# Patient Record
Sex: FEMALE | Race: WHITE | NOT HISPANIC OR LATINO | Employment: OTHER | URBAN - METROPOLITAN AREA
[De-identification: names, ages, dates, MRNs, and addresses within clinical notes are randomized per-mention and may not be internally consistent; named-entity substitution may affect disease eponyms.]

---

## 2018-11-12 RX ORDER — BETAMETHASONE DIPROPIONATE 0.5 MG/G
1 CREAM TOPICAL 2 TIMES DAILY
Refills: 0 | COMMUNITY
Start: 2018-10-10 | End: 2018-11-13

## 2018-11-12 RX ORDER — METOPROLOL TARTRATE 50 MG/1
50 TABLET, FILM COATED ORAL EVERY 12 HOURS SCHEDULED
Refills: 0 | COMMUNITY
Start: 2018-08-19

## 2018-11-12 RX ORDER — ATORVASTATIN CALCIUM 40 MG/1
20 TABLET, FILM COATED ORAL
COMMUNITY
Start: 2017-08-30 | End: 2019-02-28 | Stop reason: SDUPTHER

## 2018-11-12 RX ORDER — TRAMADOL HYDROCHLORIDE 50 MG/1
50 TABLET ORAL 3 TIMES DAILY
Refills: 0 | COMMUNITY
Start: 2018-11-09

## 2018-11-12 RX ORDER — APIXABAN 5 MG/1
5 TABLET, FILM COATED ORAL 2 TIMES DAILY
Refills: 0 | COMMUNITY
Start: 2018-11-06 | End: 2019-02-28 | Stop reason: SDUPTHER

## 2018-11-12 RX ORDER — ALENDRONATE SODIUM 70 MG/1
70 TABLET ORAL WEEKLY
Refills: 0 | COMMUNITY
Start: 2018-08-18 | End: 2020-05-11

## 2018-11-12 RX ORDER — GLUCOSAMINE/CHONDR SU A SOD 750-600 MG
1 TABLET ORAL DAILY
Refills: 0 | COMMUNITY
Start: 2018-11-01 | End: 2018-11-13

## 2018-11-12 RX ORDER — COLCHICINE 0.6 MG/1
0.6 TABLET ORAL DAILY
Refills: 0 | COMMUNITY
Start: 2018-11-05

## 2018-11-12 RX ORDER — AMLODIPINE BESYLATE 5 MG/1
5 TABLET ORAL DAILY
Refills: 0 | COMMUNITY
Start: 2018-09-11 | End: 2020-12-03 | Stop reason: SINTOL

## 2018-11-12 RX ORDER — TRAZODONE HYDROCHLORIDE 50 MG/1
50 TABLET ORAL
COMMUNITY
Start: 2018-07-12 | End: 2019-02-28 | Stop reason: SDUPTHER

## 2018-11-13 ENCOUNTER — OFFICE VISIT (OUTPATIENT)
Dept: FAMILY MEDICINE CLINIC | Facility: CLINIC | Age: 73
End: 2018-11-13
Payer: COMMERCIAL

## 2018-11-13 VITALS
WEIGHT: 142 LBS | BODY MASS INDEX: 24.24 KG/M2 | HEIGHT: 64 IN | DIASTOLIC BLOOD PRESSURE: 76 MMHG | SYSTOLIC BLOOD PRESSURE: 128 MMHG | OXYGEN SATURATION: 97 % | HEART RATE: 68 BPM

## 2018-11-13 DIAGNOSIS — Z12.31 ENCOUNTER FOR SCREENING MAMMOGRAM FOR BREAST CANCER: Primary | ICD-10-CM

## 2018-11-13 DIAGNOSIS — E78.49 OTHER HYPERLIPIDEMIA: ICD-10-CM

## 2018-11-13 DIAGNOSIS — F51.04 CHRONIC INSOMNIA: ICD-10-CM

## 2018-11-13 DIAGNOSIS — E04.1 THYROID NODULE: ICD-10-CM

## 2018-11-13 DIAGNOSIS — I10 ESSENTIAL HYPERTENSION: ICD-10-CM

## 2018-11-13 DIAGNOSIS — I67.1 INTRACRANIAL ANEURYSM: ICD-10-CM

## 2018-11-13 DIAGNOSIS — Z23 NEEDS FLU SHOT: ICD-10-CM

## 2018-11-13 DIAGNOSIS — I63.10 CEREBROVASCULAR ACCIDENT (CVA) DUE TO EMBOLISM OF PRECEREBRAL ARTERY (HCC): ICD-10-CM

## 2018-11-13 DIAGNOSIS — I48.0 PAROXYSMAL ATRIAL FIBRILLATION (HCC): ICD-10-CM

## 2018-11-13 PROBLEM — M11.249: Status: ACTIVE | Noted: 2018-11-13

## 2018-11-13 PROBLEM — M25.50 MULTIPLE JOINT PAIN: Status: ACTIVE | Noted: 2018-11-13

## 2018-11-13 PROBLEM — E55.9 VITAMIN D DEFICIENCY: Status: ACTIVE | Noted: 2018-11-13

## 2018-11-13 PROBLEM — M81.8 IDIOPATHIC OSTEOPOROSIS: Status: ACTIVE | Noted: 2018-11-13

## 2018-11-13 PROCEDURE — 1036F TOBACCO NON-USER: CPT | Performed by: INTERNAL MEDICINE

## 2018-11-13 PROCEDURE — 1160F RVW MEDS BY RX/DR IN RCRD: CPT | Performed by: INTERNAL MEDICINE

## 2018-11-13 PROCEDURE — 3725F SCREEN DEPRESSION PERFORMED: CPT | Performed by: INTERNAL MEDICINE

## 2018-11-13 PROCEDURE — 99204 OFFICE O/P NEW MOD 45 MIN: CPT | Performed by: INTERNAL MEDICINE

## 2018-11-13 PROCEDURE — 3008F BODY MASS INDEX DOCD: CPT | Performed by: INTERNAL MEDICINE

## 2018-11-13 PROCEDURE — 4040F PNEUMOC VAC/ADMIN/RCVD: CPT | Performed by: INTERNAL MEDICINE

## 2018-11-13 PROCEDURE — 3078F DIAST BP <80 MM HG: CPT | Performed by: INTERNAL MEDICINE

## 2018-11-13 PROCEDURE — 3074F SYST BP LT 130 MM HG: CPT | Performed by: INTERNAL MEDICINE

## 2018-11-13 PROCEDURE — 1101F PT FALLS ASSESS-DOCD LE1/YR: CPT | Performed by: INTERNAL MEDICINE

## 2018-11-13 RX ORDER — CYCLOBENZAPRINE HCL 10 MG
10 TABLET ORAL DAILY
COMMUNITY
End: 2018-11-13

## 2018-11-13 RX ORDER — ASPIRIN 81 MG/1
81 TABLET ORAL DAILY
COMMUNITY

## 2018-11-13 RX ORDER — DICLOFENAC POTASSIUM 50 MG/1
50 TABLET, FILM COATED ORAL AS NEEDED
COMMUNITY
End: 2019-08-01

## 2018-11-13 RX ORDER — ALPRAZOLAM 0.5 MG/1
0.5 TABLET ORAL AS NEEDED
COMMUNITY
End: 2018-11-13

## 2018-11-13 RX ORDER — CELECOXIB 200 MG/1
200 CAPSULE ORAL AS NEEDED
COMMUNITY
End: 2018-11-13

## 2018-11-13 RX ORDER — ERGOCALCIFEROL (VITAMIN D2) 1250 MCG
50000 CAPSULE ORAL WEEKLY
COMMUNITY
End: 2021-07-13

## 2018-11-13 NOTE — ASSESSMENT & PLAN NOTE
Given previous history of stroke, we discussed that continuing Eliquis would be important to prevent further strokes

## 2018-11-13 NOTE — PATIENT INSTRUCTIONS
Other hyperlipidemia  Check fasting lipid panel in the next few days  Continue atorvastatin  Paroxysmal atrial fibrillation (HCC)  Given previous history of stroke, we discussed that continuing Eliquis would be important to prevent further strokes  Essential hypertension  Blood pressure is well controlled on amlodipine and metoprolol  Thyroid nodule  Previous thyroid ultrasound confirms a multi nodular goiter  Follow clinically  Vitamin D deficiency  Continue taking ergo calciferol under the direction of Rheumatology  Chronic insomnia  Given chronic insomnia and treatment with trazodone for more than a decade, will continue treatment  Cerebrovascular accident (CVA) due to embolism of precerebral artery ([address] )  Given previous stroke, will continue Eliquis and blood pressure management  Intracranial aneurysm  Review Dr Rich Otto note from earlier this year  He recommended follow-up CT scan in April of 2019

## 2018-11-13 NOTE — ASSESSMENT & PLAN NOTE
Given chronic insomnia and treatment with trazodone for more than a decade, will continue treatment

## 2018-11-13 NOTE — ASSESSMENT & PLAN NOTE
Reviewed Dr Robby Hayes note from earlier this year  He had offer her an endovascular procedure but she was concerned about the risks and wanted to bring her son to the next visit  She is concerned about actually having a stroke because of the procedure  For now, she just wants clinical follow-up  He recommended follow-up CT scan in April of 2019

## 2018-11-13 NOTE — PROGRESS NOTES
Assessment/Plan:    Problem List Items Addressed This Visit        Endocrine    Thyroid nodule     Previous thyroid ultrasound confirms a multi nodular goiter  Follow clinically  Relevant Medications    metoprolol tartrate (LOPRESSOR) 50 mg tablet       Cardiovascular and Mediastinum    Essential hypertension     Blood pressure is well controlled on amlodipine and metoprolol  Relevant Medications    amLODIPine (NORVASC) 5 mg tablet    metoprolol tartrate (LOPRESSOR) 50 mg tablet    Paroxysmal atrial fibrillation (HCC)     Given previous history of stroke, we discussed that continuing Eliquis would be important to prevent further strokes  Relevant Medications    amLODIPine (NORVASC) 5 mg tablet    metoprolol tartrate (LOPRESSOR) 50 mg tablet    Cerebrovascular accident (CVA) due to embolism of precerebral artery ([address] )     Given previous stroke, will continue Eliquis and blood pressure management  Intracranial aneurysm     Review Dr Weldon Like note from earlier this year  He recommended follow-up CT scan in April of 2019  Relevant Orders    CTA head w wo contrast       Other    Other hyperlipidemia     Check fasting lipid panel in the next few days  Continue atorvastatin  Relevant Medications    atorvastatin (LIPITOR) 40 mg tablet    Other Relevant Orders    Lipid panel    Basic metabolic panel    Chronic insomnia     Given chronic insomnia and treatment with trazodone for more than a decade, will continue treatment  Other Visit Diagnoses     Encounter for screening mammogram for breast cancer    -  Primary    Relevant Orders    Mammo screening bilateral w 3d & cad    Needs flu shot              Chief complaint: Here to establish care    HPI:    Dyana Barthel is a 74 y o  female recently changed insurance and needs a new PCP  No acute issues today  She had a stroke 2 years ago    She presented with acute onset of right arm and leg weakness as well as dysarthria  She was admitted to [facility]   She received intravenous tPA per protocol  During hospitalization she developed an episode of atrial fibrillation which raise the suspicion of a cardioembolic stroke  Echocardiogram at that time showed normal LV function but presence of wall motion abnormalities  She was started on Eliquis, a baby aspirin, and beta-blocker  She was subsequently sent to the [facility]  Although her speech improved, sometimes she has difficulty remembering the correct word to say  Right arm strength improved but she does not feel as steady on her feet  She also has some numbness in her fingertips  She reports that she had palpitations intermittently over the years but it had never been detected on EKG  She hasn't noticed any palpitations after her hospitalization in 2016  Although she has a history of arthritis, she has never had a gout attack  The rheumatologist has her taking colchine for prevention  Past Medical History:   Diagnosis Date    Aneurysm ([address] )     Followed by Dr Misty Garcia in [place]  CTA  April 2018 Stable left C6 aneurysm    Arthritis     Atrial fibrillation (HCC)     Insomnia     Has been taking trazodone for sleep for at least a decade  No side effects  She can't sleep without the trazodone   Multinodular goiter 2016    Throid US confirmed multinodular goiter   Stroke [facility])         Past Surgical History:   Procedure Laterality Date    APPENDECTOMY      BUNIONECTOMY      CATARACT EXTRACTION W/ INTRAOCULAR LENS  IMPLANT, BILATERAL          Family History   Problem Relation Age of Onset    Cancer Mother     Stroke Mother     No Known Problems Father         Social History     Social History    Marital status:      Spouse name: N/A    Number of children: N/A    Years of education: N/A     Occupational History    Not on file       Social History Main Topics    Smoking status: Never Smoker    Smokeless tobacco: Never Used    Alcohol use No    Drug use: No    Sexual activity: Not on file     Other Topics Concern    Not on file     Social History Narrative    She lives alone in [facility]  Her son is nearby  Current Outpatient Prescriptions   Medication Sig Dispense Refill    alendronate (FOSAMAX) 70 mg tablet Take 70 mg by mouth daily  0    amLODIPine (NORVASC) 5 mg tablet Take 5 mg by mouth daily  0    aspirin (ECOTRIN LOW STRENGTH) 81 mg EC tablet Take 81 mg by mouth daily      atorvastatin (LIPITOR) 40 mg tablet Take 40 mg by mouth daily at bedtime      Calcium Carbonate-Vitamin D (CALCIUM-CARB 600 + D PO) Take 1 tablet by mouth daily      colchicine (COLCRYS) 0 6 mg tablet Take 0 6 mg by mouth daily  0    ELIQUIS 5 MG Take 5 mg by mouth 2 (two) times a day  0    ergocalciferol (ERGOCALCIFEROL) 37811 units capsule Take 50,000 Units by mouth once a week      metoprolol tartrate (LOPRESSOR) 50 mg tablet Take 50 mg by mouth every 12 (twelve) hours    0    Multiple Vitamins-Minerals (MULTIVITAMIN ADULT PO) Take 1 tablet by mouth daily      traMADol (ULTRAM) 50 mg tablet Take 50 mg by mouth 3 (three) times a day    0    traZODone (DESYREL) 50 mg tablet Take 50 mg by mouth daily at bedtime      diclofenac potassium (CATAFLAM) 50 mg tablet Take 50 mg by mouth as needed       No current facility-administered medications for this visit  No Known Allergies    Review of Systems   Constitutional: Negative for chills, diaphoresis, fever and unexpected weight change  HENT: Negative for sinus pain and sinus pressure  Eyes: Negative for visual disturbance  Respiratory: Negative for cough, shortness of breath and wheezing  Cardiovascular: Negative for chest pain and leg swelling  Gastrointestinal: Positive for constipation ( occasional, takes prune juice  She has had colonoscopy in 2013 )  Negative for abdominal pain and blood in stool  Endocrine: Negative for polydipsia and polyuria  Genitourinary: Negative for dysuria and hematuria  Gets nocturia 3-4 times a night  Musculoskeletal:        Per HPI  Neurological:        Per HPI  Hematological: Negative for adenopathy  Bruises/bleeds easily ( Attributes this to Eliquis )        /76 (BP Location: Left arm, Patient Position: Sitting, Cuff Size: Standard)   Pulse 68   Ht 5' 4" (1 626 m)   Wt 64 4 kg (142 lb)   SpO2 97%   BMI 24 37 kg/m²     General:  Well-developed, well-nourished, in no acute distress  Skin:  Warm, moist, no significant lesions  HENT:  Normocephalic, atraumatic, tympanic membranes clear bilaterally, ear canals unremarkable bilaterally, nasal mucosa without lesions, oropharynx was clear without exudate  Eyes: PERRL, EOMI, conjunctivae normal   Neck:  No thyromegaly, thyroid nodules or cervical lymphadenopathy  Cardiac:  Regular rate and rhythm, no murmur, gallop, or rub  There is no JVD or HJR  Lungs:  Clear to auscultation and percussion  Breasts: Deferred to Gyn  Abdomen:  Soft, nontender, normoactive bowel sounds, no palpable masses, no hepatosplenomegaly  :  Deferred to gyn  Musculoskeletal:  No clubbing, cyanosis, or edema  Neurologic:  Cranial nerves 2-12 intact, motor was 5/5 in all major groups, no pronator drift noted, rapid alternating movements are faster on the right than on the left (she is left handed) DTRs 2+ throughout, with the exception of her knee jerks and ankle jerks which were 3+  Psychiatric:  Mood bright, appropriate affect and insight

## 2018-11-14 NOTE — ASSESSMENT & PLAN NOTE
Although she has never had an episode of acute gout, I believe that the rheumatologist has her on colchicine for preventive reasons given the fact that she has chondrocalcinosis on her hand

## 2018-11-19 ENCOUNTER — TRANSCRIBE ORDERS (OUTPATIENT)
Dept: ADMINISTRATIVE | Facility: HOSPITAL | Age: 73
End: 2018-11-19

## 2018-11-19 ENCOUNTER — APPOINTMENT (OUTPATIENT)
Dept: LAB | Facility: CLINIC | Age: 73
End: 2018-11-19
Payer: COMMERCIAL

## 2018-11-19 DIAGNOSIS — E78.49 OTHER HYPERLIPIDEMIA: ICD-10-CM

## 2018-11-19 LAB
ANION GAP SERPL CALCULATED.3IONS-SCNC: 3 MMOL/L (ref 4–13)
BUN SERPL-MCNC: 13 MG/DL (ref 5–25)
CALCIUM SERPL-MCNC: 8.2 MG/DL (ref 8.3–10.1)
CHLORIDE SERPL-SCNC: 103 MMOL/L (ref 100–108)
CHOLEST SERPL-MCNC: 110 MG/DL (ref 50–200)
CO2 SERPL-SCNC: 30 MMOL/L (ref 21–32)
CREAT SERPL-MCNC: 0.81 MG/DL (ref 0.6–1.3)
GFR SERPL CREATININE-BSD FRML MDRD: 71 ML/MIN/1.73SQ M
GLUCOSE P FAST SERPL-MCNC: 96 MG/DL (ref 65–99)
HDLC SERPL-MCNC: 62 MG/DL (ref 40–60)
LDLC SERPL CALC-MCNC: 32 MG/DL (ref 0–100)
NONHDLC SERPL-MCNC: 48 MG/DL
POTASSIUM SERPL-SCNC: 3.8 MMOL/L (ref 3.5–5.3)
SODIUM SERPL-SCNC: 136 MMOL/L (ref 136–145)
TRIGL SERPL-MCNC: 82 MG/DL

## 2018-11-19 PROCEDURE — 80061 LIPID PANEL: CPT

## 2018-11-19 PROCEDURE — 36415 COLL VENOUS BLD VENIPUNCTURE: CPT

## 2018-11-19 PROCEDURE — 80048 BASIC METABOLIC PNL TOTAL CA: CPT

## 2018-11-20 ENCOUNTER — TELEPHONE (OUTPATIENT)
Dept: FAMILY MEDICINE CLINIC | Facility: CLINIC | Age: 73
End: 2018-11-20

## 2018-11-20 NOTE — TELEPHONE ENCOUNTER
Please let her know that her cholesterol was great  Her calcium was just a little bit low at 8 2 (normal is 8 3)  This just merits observation for now and we will repeat the metabolic panel in the future

## 2019-02-28 ENCOUNTER — OFFICE VISIT (OUTPATIENT)
Dept: FAMILY MEDICINE CLINIC | Facility: CLINIC | Age: 74
End: 2019-02-28
Payer: COMMERCIAL

## 2019-02-28 VITALS
SYSTOLIC BLOOD PRESSURE: 120 MMHG | BODY MASS INDEX: 25.06 KG/M2 | OXYGEN SATURATION: 98 % | DIASTOLIC BLOOD PRESSURE: 74 MMHG | HEIGHT: 64 IN | WEIGHT: 146.8 LBS | HEART RATE: 58 BPM

## 2019-02-28 DIAGNOSIS — I67.1 INTRACRANIAL ANEURYSM: ICD-10-CM

## 2019-02-28 DIAGNOSIS — E55.9 VITAMIN D DEFICIENCY: ICD-10-CM

## 2019-02-28 DIAGNOSIS — Z00.00 ENCOUNTER FOR MEDICARE ANNUAL WELLNESS EXAM: ICD-10-CM

## 2019-02-28 DIAGNOSIS — F51.04 CHRONIC INSOMNIA: ICD-10-CM

## 2019-02-28 DIAGNOSIS — I48.0 PAROXYSMAL ATRIAL FIBRILLATION (HCC): Primary | ICD-10-CM

## 2019-02-28 DIAGNOSIS — I10 ESSENTIAL HYPERTENSION: ICD-10-CM

## 2019-02-28 DIAGNOSIS — M81.8 IDIOPATHIC OSTEOPOROSIS: ICD-10-CM

## 2019-02-28 DIAGNOSIS — E78.49 OTHER HYPERLIPIDEMIA: ICD-10-CM

## 2019-02-28 PROCEDURE — G0439 PPPS, SUBSEQ VISIT: HCPCS | Performed by: INTERNAL MEDICINE

## 2019-02-28 PROCEDURE — 99214 OFFICE O/P EST MOD 30 MIN: CPT | Performed by: INTERNAL MEDICINE

## 2019-02-28 RX ORDER — APIXABAN 5 MG/1
5 TABLET, FILM COATED ORAL 2 TIMES DAILY
Qty: 60 TABLET | Refills: 5 | Status: SHIPPED | OUTPATIENT
Start: 2019-02-28 | End: 2019-09-11 | Stop reason: SDUPTHER

## 2019-02-28 RX ORDER — ATORVASTATIN CALCIUM 40 MG/1
20 TABLET, FILM COATED ORAL
Qty: 15 TABLET | Refills: 5 | Status: SHIPPED | OUTPATIENT
Start: 2019-02-28 | End: 2021-02-12

## 2019-02-28 RX ORDER — TRAZODONE HYDROCHLORIDE 50 MG/1
50 TABLET ORAL
Qty: 30 TABLET | Refills: 5 | Status: SHIPPED | OUTPATIENT
Start: 2019-02-28 | End: 2020-02-13

## 2019-02-28 NOTE — PATIENT INSTRUCTIONS
Intracranial aneurysm  Follow up CTA of brain scheduled for April  Essential hypertension  Excellent blood pressure on current regimen  Paroxysmal atrial fibrillation (HCC)  Rate is controlled on metoprolol  Continue Eliquis for stroke prevention  Cerebrovascular accident (CVA) due to embolism of precerebral artery (HCC)  No evidence of any new stroke  Continue Eliquis  Idiopathic osteoporosis  Most recent bone density was obtained in November  Is on alendronate 70 mg weekly  Continue to follow with Rheumatology (Dr Brice President) in [place]  Other hyperlipidemia  All excellent cholesterol on atorvastatin 1/2 of a 40 mg tablet daily  Recheck lipid panel before next visit

## 2019-02-28 NOTE — PROGRESS NOTES
Assessment/Plan:    Problem List Items Addressed This Visit        Cardiovascular and Mediastinum    Essential hypertension     Excellent blood pressure on current regimen  Paroxysmal atrial fibrillation (HCC) - Primary     Rate is controlled on metoprolol  Continue Eliquis for stroke prevention  Relevant Medications    ELIQUIS 5 MG    Intracranial aneurysm     Follow up CTA of brain scheduled for April  Musculoskeletal and Integument    Idiopathic osteoporosis     Most recent bone density was obtained in November  Is on alendronate 70 mg weekly  Continue to follow with Rheumatology (Dr Nisha Curiel) in [place]  Other    Other hyperlipidemia     All excellent cholesterol on atorvastatin 1/2 of a 40 mg tablet daily  Recheck lipid panel before next visit  Relevant Medications    atorvastatin (LIPITOR) 40 mg tablet    Other Relevant Orders    Basic metabolic panel    Lipid panel    Vitamin D deficiency    Relevant Medications    Calcium Carbonate-Vitamin D (CALCIUM-CARB 600 + D) 600-125 MG-UNIT TABS    Other Relevant Orders    Vitamin D 25 hydroxy    RESOLVED: Chronic insomnia    Relevant Medications    traZODone (DESYREL) 50 mg tablet          Subjective:     Patient ID: Sedrick Ness is a 74 y o  female here for follow up of multiple issues  She requested refills of multiple medications  She remains on Eliquis for chronic atrial fibrillation  She hasn't noted any episodes of palpitations  She hasn't had any new stroke symptoms specifically no weakness or dysarthria  She has occasional word-finding problems  She takes 1/2 of a 40 mg atorvastatin for high cholesterol  Her total cholesterol in November '18 was 110 with an LDL of 32  She saw her rheumatologist for follow-up of osteoporosis in November  A DEXA at that time revealed low bone mass at the hip, spine and other areas with a T-score of -1 3        Objective:    /74 (BP Location: Left arm, Patient Position: Sitting, Cuff Size: Standard)   Pulse 58   Ht 5' 4" (1 626 m)   Wt 66 6 kg (146 lb 12 8 oz)   SpO2 98%   BMI 25 20 kg/m²       Physical Exam    General:  Well-developed, well-nourished, in no acute distress  Cardiac:  Regular rate and rhythm, no murmur, gallop, or rub  There is no JVD or HJR  Lungs:  Clear to auscultation and percussion  Abdomen:  Soft, nontender, normoactive bowel sounds, no palpable masses, no hepatosplenomegaly  Extremities:  No clubbing, cyanosis, or edema  BMI Counseling: Body mass index is 25 2 kg/m²  Discussed the patient's BMI with her  The BMI is above average  No BMI follow-up plan is appropriate  Patient is 70 years old and weight reduction/weight gain would further complicate their underlying illness  And at BMI 25 2 and 75 years with multiple cormorbidities she is not likely going to benefit from weight loss

## 2019-02-28 NOTE — PROGRESS NOTES
Marlys Zhang is here for her Subsequent Wellness visit  Last Medicare Wellness visit information reviewed, patient interviewed and updates made to the record today  Health Risk Assessment:  Patient rates overall health as good  Patient feels that their physical health rating is Same  Eyesight was rated as Slightly worse  Hearing was rated as Slightly worse  Patient feels that their emotional and mental health rating is Same  Pain experienced by patient in the last 7 days has been None  Patient states that she has experienced no weight loss or gain in last 6 months  Emotional/Mental Health:  Patient has been feeling nervous/anxious  PHQ-9 Depression Screening:    Frequency of the following problems over the past two weeks:      1  Little interest or pleasure in doing things: 0 - not at all      2  Feeling down, depressed, or hopeless: 0 - not at all  PHQ-2 Score: 0          Broken Bones/Falls: Fall Risk Assessment:    In the past year, patient has experienced: No history of falling in past year          Bladder/Bowel:  Patient has leaked urine accidently in the last six months  Patient reports no loss of bowel control  (Additional Comments: She may have urine incontinence if she has to get up at night to urinate  No problems during the day )    Immunizations:  Patient has had a flu vaccination within the last year  Patient has received a pneumonia shot  Patient has not received a shingles shot  Patient has not received tetanus/diphtheria shot  (Additional Comments: She has a prescription to get the shingles shot  TdaP is not covered by Medicare routinely )    Home Safety:  Patient has trouble with stairs inside or outside of their home  Patient currently reports that there are no safety hazards present in home, working smoke alarms, no working carbon monoxide detectors  (Additional Comments: After her stroke, she started taking more care on the stairs in her house    Recommended a CO monitor )    Preventative Screenings:   Breast cancer screening performed, colon cancer screen completed, cholesterol screen completed, glaucoma eye exam completed, (Additional Comments: She is scheduled to see her eye doctor  She is a glaucoma suspect and does get her eye pressure checked )    Nutrition:  Current diet: Low Saturated Fat, Regular and Limited junk food with servings of the following:    Medications:  Patient is currently taking over-the-counter supplements  List of OTC medications includes: Vit D + calcium  Patient is able to manage medications  Lifestyle Choices:  Patient reports no tobacco use  Patient has not smoked or used tobacco in the past   Patient reports no alcohol use  Patient drives a vehicle  Patient wears seat belt  Current level of exercise of physical activity described by patient as: Doesn't exercise normally  Activities of Daily Living:  Can get out of bed by his or her self, able to dress self, able to make own meals, able to do own shopping, able to bathe self, can do own laundry/housekeeping, can manage own money, pay bills and track expenses    Previous Hospitalizations:  No hospitalization or ED visit in past 12 months        Advanced Directives:  Patient has decided on a power of   Patient has spoken to designated power of   Patient has completed advanced directive          Preventative Screening/Counseling:      Cardiovascular:      General: Screening Current          Diabetes:      General: Screening Current          Colorectal Cancer:      General: Screening Current          Breast Cancer:      General: Screening Current          Cervical Cancer:      General: Screening Current          Osteoporosis:      General: Screening Current      Comments: Had follow up DEXA at rheumatologist's office in November 2018        AAA:      General: Screening Not Indicated          Glaucoma:      General: Screening Current          HIV:      General: Screening Not Indicated          Hepatitis C:      General: Screening Not Indicated        Advanced Directives:   Patient has living will for healthcare, has durable POA for healthcare, patient does not have an advanced directive  5 wishes given  End of life assessment reviewed with patient  Provider agrees with end of life decisions   Additional Comments: Her son Brandon Finnegan is her POA    Immunizations: Additional Comments: Plans on getting shingles vaccine in the near future

## 2019-03-01 ENCOUNTER — TELEPHONE (OUTPATIENT)
Dept: FAMILY MEDICINE CLINIC | Facility: CLINIC | Age: 74
End: 2019-03-01

## 2019-03-01 DIAGNOSIS — M81.8 IDIOPATHIC OSTEOPOROSIS: Primary | ICD-10-CM

## 2019-03-01 DIAGNOSIS — E55.9 VITAMIN D DEFICIENCY: ICD-10-CM

## 2019-03-01 NOTE — TELEPHONE ENCOUNTER
Epic made me change to the 600-125 yesterday (actually not a big deal)  I will see if I can change it

## 2019-03-01 NOTE — TELEPHONE ENCOUNTER
Pharmacy sent paperwork regarding script for Calcium Carbonate  Vit D    They said she was always on 500-200  The script we sent for the 600-125 is not available from their supplier  They are wondering if we could change the script to the 500-200 tablets?     Thank you

## 2019-08-01 ENCOUNTER — OFFICE VISIT (OUTPATIENT)
Dept: FAMILY MEDICINE CLINIC | Facility: CLINIC | Age: 74
End: 2019-08-01
Payer: COMMERCIAL

## 2019-08-01 VITALS
HEIGHT: 64 IN | OXYGEN SATURATION: 97 % | HEART RATE: 66 BPM | DIASTOLIC BLOOD PRESSURE: 72 MMHG | SYSTOLIC BLOOD PRESSURE: 120 MMHG | WEIGHT: 142.4 LBS | BODY MASS INDEX: 24.31 KG/M2

## 2019-08-01 DIAGNOSIS — E78.49 OTHER HYPERLIPIDEMIA: ICD-10-CM

## 2019-08-01 DIAGNOSIS — L30.9 DERMATITIS: Primary | ICD-10-CM

## 2019-08-01 DIAGNOSIS — I63.10 CEREBROVASCULAR ACCIDENT (CVA) DUE TO EMBOLISM OF PRECEREBRAL ARTERY (HCC): ICD-10-CM

## 2019-08-01 DIAGNOSIS — I10 ESSENTIAL HYPERTENSION: ICD-10-CM

## 2019-08-01 DIAGNOSIS — I48.0 PAROXYSMAL ATRIAL FIBRILLATION (HCC): ICD-10-CM

## 2019-08-01 PROCEDURE — 3074F SYST BP LT 130 MM HG: CPT | Performed by: INTERNAL MEDICINE

## 2019-08-01 PROCEDURE — 1160F RVW MEDS BY RX/DR IN RCRD: CPT | Performed by: INTERNAL MEDICINE

## 2019-08-01 PROCEDURE — 1036F TOBACCO NON-USER: CPT | Performed by: INTERNAL MEDICINE

## 2019-08-01 PROCEDURE — 99214 OFFICE O/P EST MOD 30 MIN: CPT | Performed by: INTERNAL MEDICINE

## 2019-08-01 RX ORDER — BETAMETHASONE DIPROPIONATE 0.5 MG/G
CREAM TOPICAL 2 TIMES DAILY
Qty: 50 G | Refills: 1 | Status: SHIPPED | OUTPATIENT
Start: 2019-08-01 | End: 2020-09-14

## 2019-08-01 NOTE — PATIENT INSTRUCTIONS
Problem List Items Addressed This Visit        Cardiovascular and Mediastinum    Essential hypertension     Excellent blood pressure control on amlodipine 5 mg daily  Paroxysmal atrial fibrillation (HCC)     Rate is controlled on metoprolol 50 mg twice a day  Continue Eliquis for stroke prevention  Other    Other hyperlipidemia    Relevant Orders    Lipid panel    Basic metabolic panel      Other Visit Diagnoses     Dermatitis    -  Primary    Will renewed betamethasone to apply twice a day  After rash resolves, would recommend applying lotion daily after showering      Relevant Medications    betamethasone dipropionate (DIPROSONE) 0 05 % cream

## 2019-08-01 NOTE — PROGRESS NOTES
Assessment/Plan:    Problem List Items Addressed This Visit        Cardiovascular and Mediastinum    Essential hypertension     Excellent blood pressure control on amlodipine 5 mg daily  Paroxysmal atrial fibrillation (HCC)     Rate is controlled on metoprolol 50 mg twice a day  Continue Eliquis for stroke prevention  Cerebrovascular accident (CVA) due to embolism of precerebral artery (HCC)     No new stroke symptoms  Continue Eliquis for stroke prevention  Other    Other hyperlipidemia    Relevant Orders    Lipid panel    Basic metabolic panel      Other Visit Diagnoses     Dermatitis    -  Primary    Will renewed betamethasone to apply twice a day  After rash resolves, would recommend applying lotion daily after showering  Relevant Medications    betamethasone dipropionate (DIPROSONE) 0 05 % cream          Subjective:     Patient ID: Wilbert Liang is a 74 y o  female reports itchy spots on her flanks and left lower abdomen  She has had this rash in the past and her previous physician gave her betamethasone dipropionate 0 05% which she finds very helpful for the rash  However, she has been using the cream sparingly because her tube is almost out  She has not had any new stroke symptoms  She sometimes notes palpitations when she is out in the heat but most the time she has no cardiac symptoms  She is taking amlodipine 5 mg daily for her blood pressure  She wanted to know if she could ever get off her blood pressure medications  Objective:    /72 (BP Location: Left arm, Patient Position: Sitting, Cuff Size: Standard)   Pulse 66   Ht 5' 4" (1 626 m)   Wt 64 6 kg (142 lb 6 4 oz)   SpO2 97%   BMI 24 44 kg/m²     Wt Readings from Last 3 Encounters:   08/01/19 64 6 kg (142 lb 6 4 oz)   02/28/19 66 6 kg (146 lb 12 8 oz)   11/13/18 64 4 kg (142 lb)         Physical Exam    General:  Well-developed, well-nourished, in no acute distress    Cardiac:  Regular rate and rhythm, no murmur, gallop, or rub  There is no JVD or HJR  Lungs:  Clear to auscultation and percussion  Abdomen:  Soft, nontender, normoactive bowel sounds, no palpable masses, no hepatosplenomegaly  Skin:  Superficial excoriations on the flanks and the left lower abdomen bilaterally  The skin itself does have some mild hand discoloration over the same regions  Extremities:  No clubbing, cyanosis, or edema

## 2019-09-11 DIAGNOSIS — I48.0 PAROXYSMAL ATRIAL FIBRILLATION (HCC): ICD-10-CM

## 2019-09-11 RX ORDER — GLUCOSAMINE/CHONDR SU A SOD 750-600 MG
TABLET ORAL
Refills: 0 | COMMUNITY
Start: 2019-08-28 | End: 2019-09-11

## 2019-09-11 RX ORDER — APIXABAN 5 MG/1
TABLET, FILM COATED ORAL
Qty: 60 TABLET | Refills: 5 | Status: SHIPPED | OUTPATIENT
Start: 2019-09-11 | End: 2020-06-02

## 2019-09-13 DIAGNOSIS — M81.8 IDIOPATHIC OSTEOPOROSIS: ICD-10-CM

## 2019-09-13 RX ORDER — GLUCOSAMINE/CHONDR SU A SOD 750-600 MG
TABLET ORAL
Qty: 30 TABLET | Refills: 5 | Status: SHIPPED | OUTPATIENT
Start: 2019-09-13 | End: 2020-04-01

## 2019-10-28 ENCOUNTER — APPOINTMENT (OUTPATIENT)
Dept: LAB | Facility: CLINIC | Age: 74
End: 2019-10-28
Payer: COMMERCIAL

## 2019-10-28 ENCOUNTER — TRANSCRIBE ORDERS (OUTPATIENT)
Dept: URGENT CARE | Facility: CLINIC | Age: 74
End: 2019-10-28

## 2019-10-28 DIAGNOSIS — E78.49 OTHER HYPERLIPIDEMIA: ICD-10-CM

## 2019-10-28 DIAGNOSIS — E78.5 HYPERLIPIDEMIA, UNSPECIFIED HYPERLIPIDEMIA TYPE: ICD-10-CM

## 2019-10-28 DIAGNOSIS — E78.5 HYPERLIPIDEMIA, UNSPECIFIED HYPERLIPIDEMIA TYPE: Primary | ICD-10-CM

## 2019-10-28 LAB
ANION GAP SERPL CALCULATED.3IONS-SCNC: 7 MMOL/L (ref 4–13)
ANION GAP SERPL CALCULATED.3IONS-SCNC: 9 MMOL/L (ref 4–13)
AST SERPL W P-5'-P-CCNC: 15 U/L (ref 5–45)
BUN SERPL-MCNC: 14 MG/DL (ref 5–25)
CALCIUM SERPL-MCNC: 8.9 MG/DL (ref 8.3–10.1)
CHLORIDE SERPL-SCNC: 110 MMOL/L (ref 100–108)
CHLORIDE SERPL-SCNC: 111 MMOL/L (ref 100–108)
CHOLEST SERPL-MCNC: 114 MG/DL (ref 50–200)
CK SERPL-CCNC: 45 U/L (ref 26–192)
CO2 SERPL-SCNC: 24 MMOL/L (ref 21–32)
CO2 SERPL-SCNC: 25 MMOL/L (ref 21–32)
CREAT SERPL-MCNC: 0.74 MG/DL (ref 0.6–1.3)
GFR SERPL CREATININE-BSD FRML MDRD: 79 ML/MIN/1.73SQ M
GLUCOSE P FAST SERPL-MCNC: 93 MG/DL (ref 65–99)
HDLC SERPL-MCNC: 66 MG/DL
LDLC SERPL CALC-MCNC: 35 MG/DL (ref 0–100)
MAGNESIUM SERPL-MCNC: 2.2 MG/DL (ref 1.6–2.6)
NONHDLC SERPL-MCNC: 48 MG/DL
POTASSIUM SERPL-SCNC: 3.9 MMOL/L (ref 3.5–5.3)
POTASSIUM SERPL-SCNC: 4 MMOL/L (ref 3.5–5.3)
SODIUM SERPL-SCNC: 142 MMOL/L (ref 136–145)
SODIUM SERPL-SCNC: 144 MMOL/L (ref 136–145)
TRIGL SERPL-MCNC: 65 MG/DL
TSH SERPL DL<=0.05 MIU/L-ACNC: 2.12 UIU/ML (ref 0.36–3.74)

## 2019-10-28 PROCEDURE — 80061 LIPID PANEL: CPT

## 2019-10-28 PROCEDURE — 80051 ELECTROLYTE PANEL: CPT

## 2019-10-28 PROCEDURE — 84450 TRANSFERASE (AST) (SGOT): CPT

## 2019-10-28 PROCEDURE — 84443 ASSAY THYROID STIM HORMONE: CPT

## 2019-10-28 PROCEDURE — 83735 ASSAY OF MAGNESIUM: CPT

## 2019-10-28 PROCEDURE — 80048 BASIC METABOLIC PNL TOTAL CA: CPT

## 2019-10-28 PROCEDURE — 36415 COLL VENOUS BLD VENIPUNCTURE: CPT

## 2019-10-28 PROCEDURE — 82550 ASSAY OF CK (CPK): CPT

## 2019-11-12 ENCOUNTER — TELEPHONE (OUTPATIENT)
Dept: FAMILY MEDICINE CLINIC | Facility: CLINIC | Age: 74
End: 2019-11-12

## 2019-11-12 NOTE — TELEPHONE ENCOUNTER
Patient was given a script in September for Calcium Carbonate to be taken twice a day  She said she has only ever taken it once a day in the past  She feels sick when she takes it twice and is wondering if that is why she didn't feel good with 2  She said she had a stroke in the past and is unsure if she should change how she is taking this medication

## 2019-11-12 NOTE — TELEPHONE ENCOUNTER
She can take the calcium carbonate once a day  I do not think this has anything to do with any potential risk for stroke

## 2020-02-13 DIAGNOSIS — F51.04 CHRONIC INSOMNIA: ICD-10-CM

## 2020-02-13 RX ORDER — TRAZODONE HYDROCHLORIDE 50 MG/1
TABLET ORAL
Qty: 30 TABLET | Refills: 5 | Status: SHIPPED | OUTPATIENT
Start: 2020-02-13 | End: 2020-08-31

## 2020-04-01 DIAGNOSIS — M81.8 IDIOPATHIC OSTEOPOROSIS: ICD-10-CM

## 2020-04-01 RX ORDER — GLUCOSAMINE/CHONDR SU A SOD 750-600 MG
TABLET ORAL
Qty: 30 TABLET | Refills: 5 | Status: SHIPPED | OUTPATIENT
Start: 2020-04-01 | End: 2020-04-13

## 2020-04-11 DIAGNOSIS — M81.8 IDIOPATHIC OSTEOPOROSIS: ICD-10-CM

## 2020-04-13 RX ORDER — GLUCOSAMINE/CHONDR SU A SOD 750-600 MG
TABLET ORAL
Qty: 30 TABLET | Refills: 5 | Status: SHIPPED | OUTPATIENT
Start: 2020-04-13 | End: 2020-09-21 | Stop reason: SDUPTHER

## 2020-05-11 ENCOUNTER — OFFICE VISIT (OUTPATIENT)
Dept: FAMILY MEDICINE CLINIC | Facility: CLINIC | Age: 75
End: 2020-05-11
Payer: COMMERCIAL

## 2020-05-11 VITALS
SYSTOLIC BLOOD PRESSURE: 118 MMHG | HEART RATE: 55 BPM | OXYGEN SATURATION: 97 % | BODY MASS INDEX: 26.09 KG/M2 | TEMPERATURE: 98.1 F | WEIGHT: 152.8 LBS | DIASTOLIC BLOOD PRESSURE: 68 MMHG | HEIGHT: 64 IN

## 2020-05-11 DIAGNOSIS — I10 ESSENTIAL HYPERTENSION: Primary | ICD-10-CM

## 2020-05-11 DIAGNOSIS — I63.10 CEREBROVASCULAR ACCIDENT (CVA) DUE TO EMBOLISM OF PRECEREBRAL ARTERY (HCC): ICD-10-CM

## 2020-05-11 DIAGNOSIS — M81.8 IDIOPATHIC OSTEOPOROSIS: ICD-10-CM

## 2020-05-11 DIAGNOSIS — E78.49 OTHER HYPERLIPIDEMIA: ICD-10-CM

## 2020-05-11 DIAGNOSIS — I48.0 PAROXYSMAL ATRIAL FIBRILLATION (HCC): ICD-10-CM

## 2020-05-11 PROCEDURE — 1170F FXNL STATUS ASSESSED: CPT | Performed by: INTERNAL MEDICINE

## 2020-05-11 PROCEDURE — 1125F AMNT PAIN NOTED PAIN PRSNT: CPT | Performed by: INTERNAL MEDICINE

## 2020-05-11 PROCEDURE — 4040F PNEUMOC VAC/ADMIN/RCVD: CPT | Performed by: INTERNAL MEDICINE

## 2020-05-11 PROCEDURE — G0439 PPPS, SUBSEQ VISIT: HCPCS | Performed by: INTERNAL MEDICINE

## 2020-05-11 PROCEDURE — 3074F SYST BP LT 130 MM HG: CPT | Performed by: INTERNAL MEDICINE

## 2020-05-11 PROCEDURE — 1160F RVW MEDS BY RX/DR IN RCRD: CPT | Performed by: INTERNAL MEDICINE

## 2020-05-11 PROCEDURE — 1036F TOBACCO NON-USER: CPT | Performed by: INTERNAL MEDICINE

## 2020-05-11 PROCEDURE — 3078F DIAST BP <80 MM HG: CPT | Performed by: INTERNAL MEDICINE

## 2020-05-11 PROCEDURE — 3008F BODY MASS INDEX DOCD: CPT | Performed by: INTERNAL MEDICINE

## 2020-05-11 PROCEDURE — 99214 OFFICE O/P EST MOD 30 MIN: CPT | Performed by: INTERNAL MEDICINE

## 2020-06-02 DIAGNOSIS — I48.0 PAROXYSMAL ATRIAL FIBRILLATION (HCC): ICD-10-CM

## 2020-06-02 RX ORDER — APIXABAN 5 MG/1
TABLET, FILM COATED ORAL
Qty: 60 TABLET | Refills: 5 | Status: SHIPPED | OUTPATIENT
Start: 2020-06-02 | End: 2020-11-25

## 2020-07-09 ENCOUNTER — OFFICE VISIT (OUTPATIENT)
Dept: FAMILY MEDICINE CLINIC | Facility: CLINIC | Age: 75
End: 2020-07-09
Payer: COMMERCIAL

## 2020-07-09 VITALS
HEIGHT: 64 IN | BODY MASS INDEX: 24.72 KG/M2 | WEIGHT: 144.8 LBS | DIASTOLIC BLOOD PRESSURE: 68 MMHG | TEMPERATURE: 98 F | OXYGEN SATURATION: 98 % | SYSTOLIC BLOOD PRESSURE: 120 MMHG | HEART RATE: 55 BPM

## 2020-07-09 DIAGNOSIS — G25.81 RESTLESS LEGS SYNDROME: Primary | ICD-10-CM

## 2020-07-09 DIAGNOSIS — G47.9 DIFFICULTY SLEEPING: ICD-10-CM

## 2020-07-09 PROCEDURE — 3074F SYST BP LT 130 MM HG: CPT | Performed by: NURSE PRACTITIONER

## 2020-07-09 PROCEDURE — 3008F BODY MASS INDEX DOCD: CPT | Performed by: NURSE PRACTITIONER

## 2020-07-09 PROCEDURE — 3078F DIAST BP <80 MM HG: CPT | Performed by: NURSE PRACTITIONER

## 2020-07-09 PROCEDURE — 1160F RVW MEDS BY RX/DR IN RCRD: CPT | Performed by: NURSE PRACTITIONER

## 2020-07-09 PROCEDURE — 4040F PNEUMOC VAC/ADMIN/RCVD: CPT | Performed by: NURSE PRACTITIONER

## 2020-07-09 PROCEDURE — 99213 OFFICE O/P EST LOW 20 MIN: CPT | Performed by: NURSE PRACTITIONER

## 2020-07-09 PROCEDURE — 1036F TOBACCO NON-USER: CPT | Performed by: NURSE PRACTITIONER

## 2020-07-09 RX ORDER — ROPINIROLE 0.5 MG/1
0.5 TABLET, FILM COATED ORAL
Qty: 30 TABLET | Refills: 1 | Status: SHIPPED | OUTPATIENT
Start: 2020-07-09 | End: 2020-08-28

## 2020-07-09 NOTE — PROGRESS NOTES
Assessment/Plan:       Diagnoses and all orders for this visit:    Restless legs syndrome  -     rOPINIRole (REQUIP) 0 5 mg tablet; Take 1 tablet (0 5 mg total) by mouth daily at bedtime    Difficulty sleeping  -     rOPINIRole (REQUIP) 0 5 mg tablet; Take 1 tablet (0 5 mg total) by mouth daily at bedtime      Will start her on Requip 0 5 mg at bedtime, to make us aware if it is not helping  If her sleep does not improve, she will also make us aware  Subjective:      Patient ID: Jose Boyd is a 66 y o  female  Here today for a same day visit, reports an increase in her restless legs at night since  Notes she had a cortisone injection in her back three weeks ago which may have increased her symptoms but she is now unable to sleep due to her leg movements  Reports she has had issues with restless legs intermittently for the past 30 years but has been able to control them with either pickle juice or mustard  She also does not feel the trazodone is working for her sleeping  The following portions of the patient's history were reviewed and updated as appropriate: allergies, current medications, past family history, past medical history, past social history, past surgical history and problem list     Review of Systems   Neurological:        Please see HPI  All other systems reviewed and are negative for this visit  Objective:      /68 (BP Location: Left arm, Patient Position: Sitting, Cuff Size: Standard)   Pulse 55   Temp 98 °F (36 7 °C)   Ht 5' 4" (1 626 m)   Wt 65 7 kg (144 lb 12 8 oz)   SpO2 98%   BMI 24 85 kg/m²          Physical Exam   Constitutional: She is oriented to person, place, and time  Musculoskeletal:        Right upper leg: She exhibits no swelling and no deformity  Left upper leg: She exhibits no swelling and no deformity  Right lower leg: She exhibits no swelling and no deformity          Left lower leg: She exhibits no swelling and no deformity  Neurological: She is alert and oriented to person, place, and time  She has normal strength   Coordination and gait normal

## 2020-08-28 DIAGNOSIS — G47.9 DIFFICULTY SLEEPING: ICD-10-CM

## 2020-08-28 DIAGNOSIS — G25.81 RESTLESS LEGS SYNDROME: ICD-10-CM

## 2020-08-28 RX ORDER — ROPINIROLE 0.5 MG/1
TABLET, FILM COATED ORAL
Qty: 30 TABLET | Refills: 1 | Status: SHIPPED | OUTPATIENT
Start: 2020-08-28 | End: 2020-10-30

## 2020-08-30 DIAGNOSIS — F51.04 CHRONIC INSOMNIA: ICD-10-CM

## 2020-08-31 RX ORDER — TRAZODONE HYDROCHLORIDE 50 MG/1
TABLET ORAL
Qty: 30 TABLET | Refills: 5 | Status: SHIPPED | OUTPATIENT
Start: 2020-08-31 | End: 2021-03-01

## 2020-09-14 ENCOUNTER — OFFICE VISIT (OUTPATIENT)
Dept: FAMILY MEDICINE CLINIC | Facility: CLINIC | Age: 75
End: 2020-09-14
Payer: COMMERCIAL

## 2020-09-14 VITALS
WEIGHT: 146.8 LBS | DIASTOLIC BLOOD PRESSURE: 80 MMHG | HEART RATE: 55 BPM | SYSTOLIC BLOOD PRESSURE: 128 MMHG | BODY MASS INDEX: 25.06 KG/M2 | OXYGEN SATURATION: 95 % | HEIGHT: 64 IN | TEMPERATURE: 98 F

## 2020-09-14 DIAGNOSIS — Z23 NEEDS FLU SHOT: Primary | ICD-10-CM

## 2020-09-14 DIAGNOSIS — R60.0 BILATERAL LOWER EXTREMITY EDEMA: ICD-10-CM

## 2020-09-14 PROCEDURE — G0008 ADMIN INFLUENZA VIRUS VAC: HCPCS | Performed by: INTERNAL MEDICINE

## 2020-09-14 PROCEDURE — 99213 OFFICE O/P EST LOW 20 MIN: CPT | Performed by: INTERNAL MEDICINE

## 2020-09-14 PROCEDURE — 90662 IIV NO PRSV INCREASED AG IM: CPT | Performed by: INTERNAL MEDICINE

## 2020-09-14 RX ORDER — FUROSEMIDE 20 MG/1
20 TABLET ORAL DAILY
Qty: 30 TABLET | Refills: 5 | Status: SHIPPED | OUTPATIENT
Start: 2020-09-14 | End: 2020-09-22

## 2020-09-14 NOTE — PROGRESS NOTES
Assessment/Plan:    Problem List Items Addressed This Visit        Other    Bilateral lower extremity edema     Unclear etiology though she does have some hepatic jugular reflux  There is no overt congestive heart failure  Her symptoms are waxing and waning  We are going to give her a trial of Lasix 20 mg daily for the next few days and reassess in 1 week  Consider getting another echocardiogram          Relevant Medications    furosemide (LASIX) 20 mg tablet      Other Visit Diagnoses     Needs flu shot    -  Primary    Relevant Orders    influenza vaccine, high-dose, PF 0 7 mL (FLUZONE HIGH-DOSE)          Chief Complaint     Leg Swelling; Dizziness          Patient ID: Alicia Cosme is a 66 y o  female who returns for evaluation of leg swelling  She noticed bilateral lower extremity edema about 3 weeks  It has been waxing and waning  She would nerve come in to be seen except for her edema was worse yesterday and her friends urged her to make an appointment  No recent travel, no trauma  No change in her medication regimen other than that they had added Requip for restless legs  She is on amlodipine 5 mg daily but this is long-term  Note change in her diet  He avoid salt  She had an echocardiogram in March of this year which revealed normal LV function  Objective:    /80 (BP Location: Left arm, Patient Position: Sitting, Cuff Size: Standard)   Pulse 55   Temp 98 °F (36 7 °C)   Ht 5' 4" (1 626 m)   Wt 66 6 kg (146 lb 12 8 oz)   SpO2 95%   BMI 25 20 kg/m²     Wt Readings from Last 6 Encounters:   09/14/20 66 6 kg (146 lb 12 8 oz)   07/09/20 65 7 kg (144 lb 12 8 oz)   05/11/20 69 3 kg (152 lb 12 8 oz)   08/01/19 64 6 kg (142 lb 6 4 oz)   02/28/19 66 6 kg (146 lb 12 8 oz)   11/13/18 64 4 kg (142 lb)         Physical Exam    General:  Well-developed, well-nourished, in no acute distress  Cardiac:  Regular rate and rhythm, no murmur, gallop, or rub    There is no JVD but she does have HJR   Lungs:  Clear to auscultation and percussion  Abdomen:  Soft, nontender, normoactive bowel sounds, no palpable masses, no hepatosplenomegaly  Extremities:  No clubbing, cyanosis, trace bilateral lower extremity edema

## 2020-09-14 NOTE — ASSESSMENT & PLAN NOTE
Unclear etiology though she does have some hepatic jugular reflux  There is no overt congestive heart failure  Her symptoms are waxing and waning  We are going to give her a trial of Lasix 20 mg daily for the next few days and reassess in 1 week    Consider getting another echocardiogram

## 2020-09-14 NOTE — PATIENT INSTRUCTIONS
Take furosemide (also known as Lasix) 20 mg daily for the next few days  Will reassess in about 1 week

## 2020-09-21 ENCOUNTER — OFFICE VISIT (OUTPATIENT)
Dept: FAMILY MEDICINE CLINIC | Facility: CLINIC | Age: 75
End: 2020-09-21
Payer: COMMERCIAL

## 2020-09-21 VITALS
WEIGHT: 147 LBS | HEIGHT: 64 IN | HEART RATE: 59 BPM | SYSTOLIC BLOOD PRESSURE: 114 MMHG | BODY MASS INDEX: 25.1 KG/M2 | DIASTOLIC BLOOD PRESSURE: 60 MMHG | OXYGEN SATURATION: 97 %

## 2020-09-21 DIAGNOSIS — R60.0 BILATERAL LOWER EXTREMITY EDEMA: ICD-10-CM

## 2020-09-21 DIAGNOSIS — M81.8 IDIOPATHIC OSTEOPOROSIS: ICD-10-CM

## 2020-09-21 DIAGNOSIS — R35.0 INCREASED FREQUENCY OF URINATION: Primary | ICD-10-CM

## 2020-09-21 LAB
BACTERIA UR QL AUTO: ABNORMAL /HPF
BILIRUB UR QL STRIP: NEGATIVE
CAOX CRY URNS QL MICRO: ABNORMAL /HPF
CLARITY UR: ABNORMAL
COLOR UR: ABNORMAL
GLUCOSE UR STRIP-MCNC: NEGATIVE MG/DL
HGB UR QL STRIP.AUTO: NEGATIVE
HYALINE CASTS #/AREA URNS LPF: ABNORMAL /LPF
KETONES UR STRIP-MCNC: ABNORMAL MG/DL
LEUKOCYTE ESTERASE UR QL STRIP: ABNORMAL
MUCOUS THREADS UR QL AUTO: ABNORMAL
NITRITE UR QL STRIP: NEGATIVE
NON-SQ EPI CELLS URNS QL MICRO: ABNORMAL /HPF
PH UR STRIP.AUTO: 6 [PH]
PROT UR STRIP-MCNC: ABNORMAL MG/DL
RBC #/AREA URNS AUTO: ABNORMAL /HPF
SP GR UR STRIP.AUTO: 1.03 (ref 1–1.03)
UROBILINOGEN UR QL STRIP.AUTO: 0.2 E.U./DL
WBC #/AREA URNS AUTO: ABNORMAL /HPF

## 2020-09-21 PROCEDURE — 99213 OFFICE O/P EST LOW 20 MIN: CPT | Performed by: INTERNAL MEDICINE

## 2020-09-21 PROCEDURE — 87086 URINE CULTURE/COLONY COUNT: CPT | Performed by: INTERNAL MEDICINE

## 2020-09-21 PROCEDURE — 3078F DIAST BP <80 MM HG: CPT | Performed by: INTERNAL MEDICINE

## 2020-09-21 PROCEDURE — 1160F RVW MEDS BY RX/DR IN RCRD: CPT | Performed by: INTERNAL MEDICINE

## 2020-09-21 PROCEDURE — 1036F TOBACCO NON-USER: CPT | Performed by: INTERNAL MEDICINE

## 2020-09-21 PROCEDURE — 81001 URINALYSIS AUTO W/SCOPE: CPT | Performed by: INTERNAL MEDICINE

## 2020-09-21 RX ORDER — GLUCOSAMINE/CHONDR SU A SOD 750-600 MG
1 TABLET ORAL DAILY
COMMUNITY
Start: 2020-09-21 | End: 2020-10-29

## 2020-09-21 NOTE — PROGRESS NOTES
Assessment/Plan:    Problem List Items Addressed This Visit        Musculoskeletal and Integument    Idiopathic osteoporosis    Relevant Medications    calcium-vitamin D (RA Hi Marcus) 500 mg-200 units per tablet      Other Visit Diagnoses     Increased frequency of urination    -  Primary    Relevant Orders    UA w Reflex to Microscopic w Reflex to Culture (Completed)    Urine Microscopic (Completed)    Urine culture          Chief Complaint     Follow-up          Patient ID: Michael Al is a 66 y o  female who returns for routine follow up  She took the furosemide 20 mg daily for 3 days last week  She didn't notice any change in her urine output the first 2 days but on the third day she noticed a dramatic increase in her urine output  She stopped taking it that day and has persistent increase urge to urinate which has persisted  No dysuria or hematuria  Objective:    /60 (BP Location: Right arm, Patient Position: Sitting, Cuff Size: Standard)   Pulse 59   Ht 5' 4" (1 626 m)   Wt 66 7 kg (147 lb)   SpO2 97%   BMI 25 23 kg/m²     Wt Readings from Last 3 Encounters:   09/21/20 66 7 kg (147 lb)   09/14/20 66 6 kg (146 lb 12 8 oz)   07/09/20 65 7 kg (144 lb 12 8 oz)         Physical Exam    General:  Well-developed, well-nourished, in no acute distress  Cardiac:  Regular rate and rhythm, no murmur, gallop, or rub  There is no JVD or HJR today (last visit she did have HJR)  Lungs:  Clear to auscultation and percussion  Abdomen:  Soft, nontender, normoactive bowel sounds, no palpable masses, no hepatosplenomegaly  Extremities:  No clubbing, cyanosis, or edema

## 2020-09-22 LAB — BACTERIA UR CULT: NORMAL

## 2020-09-22 NOTE — ASSESSMENT & PLAN NOTE
This has resolved  It is not clear if the 2 doses of furosemide did this or whether it just resolved on its own  She does not have any evidence of jugular venous distention or HJR today  Would just recommend observation for now

## 2020-09-24 ENCOUNTER — TELEPHONE (OUTPATIENT)
Dept: FAMILY MEDICINE CLINIC | Facility: CLINIC | Age: 75
End: 2020-09-24

## 2020-09-24 NOTE — TELEPHONE ENCOUNTER
Urine culture revealed mixed contaminants not consistent with UTI  Please call Haile Betancourt and let her know  Please get an update on her voiding symptoms

## 2020-10-29 DIAGNOSIS — M81.8 IDIOPATHIC OSTEOPOROSIS: ICD-10-CM

## 2020-10-29 RX ORDER — GLUCOSAMINE/CHONDR SU A SOD 750-600 MG
TABLET ORAL
Qty: 30 TABLET | Refills: 5 | Status: SHIPPED | OUTPATIENT
Start: 2020-10-29 | End: 2021-07-13 | Stop reason: SDUPTHER

## 2020-10-30 DIAGNOSIS — G47.9 DIFFICULTY SLEEPING: ICD-10-CM

## 2020-10-30 DIAGNOSIS — G25.81 RESTLESS LEGS SYNDROME: ICD-10-CM

## 2020-10-30 RX ORDER — ROPINIROLE 0.5 MG/1
TABLET, FILM COATED ORAL
Qty: 30 TABLET | Refills: 1 | Status: SHIPPED | OUTPATIENT
Start: 2020-10-30 | End: 2020-12-03 | Stop reason: SDUPTHER

## 2020-11-25 DIAGNOSIS — I48.0 PAROXYSMAL ATRIAL FIBRILLATION (HCC): ICD-10-CM

## 2020-11-25 RX ORDER — APIXABAN 5 MG/1
TABLET, FILM COATED ORAL
Qty: 60 TABLET | Refills: 5 | Status: SHIPPED | OUTPATIENT
Start: 2020-11-25 | End: 2021-05-29 | Stop reason: SDUPTHER

## 2020-11-27 DIAGNOSIS — M81.8 IDIOPATHIC OSTEOPOROSIS: ICD-10-CM

## 2020-12-03 ENCOUNTER — OFFICE VISIT (OUTPATIENT)
Dept: FAMILY MEDICINE CLINIC | Facility: CLINIC | Age: 75
End: 2020-12-03
Payer: COMMERCIAL

## 2020-12-03 VITALS
OXYGEN SATURATION: 98 % | DIASTOLIC BLOOD PRESSURE: 70 MMHG | SYSTOLIC BLOOD PRESSURE: 122 MMHG | HEIGHT: 64 IN | TEMPERATURE: 97.3 F | HEART RATE: 56 BPM | BODY MASS INDEX: 25.71 KG/M2 | WEIGHT: 150.6 LBS

## 2020-12-03 DIAGNOSIS — E78.49 OTHER HYPERLIPIDEMIA: ICD-10-CM

## 2020-12-03 DIAGNOSIS — L57.0 AK (ACTINIC KERATOSIS): ICD-10-CM

## 2020-12-03 DIAGNOSIS — I48.0 PAROXYSMAL ATRIAL FIBRILLATION (HCC): ICD-10-CM

## 2020-12-03 DIAGNOSIS — G25.81 RESTLESS LEGS: ICD-10-CM

## 2020-12-03 DIAGNOSIS — M81.8 IDIOPATHIC OSTEOPOROSIS: Primary | ICD-10-CM

## 2020-12-03 DIAGNOSIS — I10 ESSENTIAL HYPERTENSION: ICD-10-CM

## 2020-12-03 DIAGNOSIS — R60.0 BILATERAL LOWER EXTREMITY EDEMA: ICD-10-CM

## 2020-12-03 DIAGNOSIS — G25.81 RESTLESS LEGS SYNDROME: ICD-10-CM

## 2020-12-03 DIAGNOSIS — G47.9 DIFFICULTY SLEEPING: ICD-10-CM

## 2020-12-03 DIAGNOSIS — I63.10 CEREBROVASCULAR ACCIDENT (CVA) DUE TO EMBOLISM OF PRECEREBRAL ARTERY (HCC): ICD-10-CM

## 2020-12-03 PROCEDURE — 99214 OFFICE O/P EST MOD 30 MIN: CPT | Performed by: INTERNAL MEDICINE

## 2020-12-03 PROCEDURE — 3078F DIAST BP <80 MM HG: CPT | Performed by: INTERNAL MEDICINE

## 2020-12-03 PROCEDURE — 3074F SYST BP LT 130 MM HG: CPT | Performed by: INTERNAL MEDICINE

## 2020-12-03 PROCEDURE — 1036F TOBACCO NON-USER: CPT | Performed by: INTERNAL MEDICINE

## 2020-12-03 PROCEDURE — 3725F SCREEN DEPRESSION PERFORMED: CPT | Performed by: INTERNAL MEDICINE

## 2020-12-03 PROCEDURE — 1160F RVW MEDS BY RX/DR IN RCRD: CPT | Performed by: INTERNAL MEDICINE

## 2020-12-03 RX ORDER — ROPINIROLE 0.5 MG/1
0.5 TABLET, FILM COATED ORAL 2 TIMES DAILY
Qty: 60 TABLET | Refills: 5 | Status: SHIPPED | OUTPATIENT
Start: 2020-12-03 | End: 2021-01-19

## 2020-12-03 RX ORDER — ALENDRONATE SODIUM 70 MG/1
70 TABLET ORAL WEEKLY
COMMUNITY
Start: 2020-11-23 | End: 2022-01-10 | Stop reason: SINTOL

## 2020-12-03 RX ORDER — FELODIPINE 5 MG/1
5 TABLET, EXTENDED RELEASE ORAL DAILY
Qty: 30 TABLET | Refills: 3 | Status: SHIPPED | OUTPATIENT
Start: 2020-12-03 | End: 2021-01-19

## 2020-12-04 PROCEDURE — 17000 DESTRUCT PREMALG LESION: CPT | Performed by: INTERNAL MEDICINE

## 2020-12-07 ENCOUNTER — LAB (OUTPATIENT)
Dept: LAB | Facility: CLINIC | Age: 75
End: 2020-12-07
Payer: COMMERCIAL

## 2020-12-07 ENCOUNTER — TRANSCRIBE ORDERS (OUTPATIENT)
Dept: ADMINISTRATIVE | Facility: HOSPITAL | Age: 75
End: 2020-12-07

## 2020-12-07 DIAGNOSIS — G25.81 RESTLESS LEGS SYNDROME: ICD-10-CM

## 2020-12-07 DIAGNOSIS — M81.8 IDIOPATHIC OSTEOPOROSIS: Primary | ICD-10-CM

## 2020-12-07 DIAGNOSIS — M81.8 IDIOPATHIC OSTEOPOROSIS: ICD-10-CM

## 2020-12-07 LAB
25(OH)D3 SERPL-MCNC: 38.7 NG/ML (ref 30–100)
CALCIUM SERPL-MCNC: 9.4 MG/DL (ref 8.3–10.1)
CREAT SERPL-MCNC: 0.82 MG/DL (ref 0.6–1.3)
FERRITIN SERPL-MCNC: 68 NG/ML (ref 8–388)
GFR SERPL CREATININE-BSD FRML MDRD: 69 ML/MIN/1.73SQ M
IRON SATN MFR SERPL: 16 %
IRON SERPL-MCNC: 47 UG/DL (ref 50–170)
TIBC SERPL-MCNC: 300 UG/DL (ref 250–450)

## 2020-12-07 PROCEDURE — 82306 VITAMIN D 25 HYDROXY: CPT

## 2020-12-07 PROCEDURE — 83550 IRON BINDING TEST: CPT

## 2020-12-07 PROCEDURE — 82565 ASSAY OF CREATININE: CPT

## 2020-12-07 PROCEDURE — 36415 COLL VENOUS BLD VENIPUNCTURE: CPT

## 2020-12-07 PROCEDURE — 82728 ASSAY OF FERRITIN: CPT

## 2020-12-07 PROCEDURE — 82310 ASSAY OF CALCIUM: CPT

## 2020-12-07 PROCEDURE — 83540 ASSAY OF IRON: CPT

## 2021-01-04 ENCOUNTER — TELEPHONE (OUTPATIENT)
Dept: FAMILY MEDICINE CLINIC | Facility: CLINIC | Age: 76
End: 2021-01-04

## 2021-01-04 NOTE — TELEPHONE ENCOUNTER
Patient called and stated that her legs are still swelling but only in the evenings  She states that she is unable to take the medication that you gave her because she gets very very dizzy  Patient does have an appointment tomorrow 01/05 but i advised patient that we have to cancel this appointment due to [place] just [place] on [place]  Patient is unable to do a virtual visit as she does not have a smart phone  Patient would like to know what she can do next about the swelling?

## 2021-01-04 NOTE — TELEPHONE ENCOUNTER
I would recommend that she wear support stockings as much as possible since she did not seem to tolerate the furosemide diuretic that we gave her

## 2021-01-19 ENCOUNTER — OFFICE VISIT (OUTPATIENT)
Dept: FAMILY MEDICINE CLINIC | Facility: CLINIC | Age: 76
End: 2021-01-19
Payer: COMMERCIAL

## 2021-01-19 VITALS
SYSTOLIC BLOOD PRESSURE: 122 MMHG | HEIGHT: 64 IN | WEIGHT: 148.6 LBS | TEMPERATURE: 97.6 F | DIASTOLIC BLOOD PRESSURE: 72 MMHG | OXYGEN SATURATION: 97 % | BODY MASS INDEX: 25.37 KG/M2 | HEART RATE: 62 BPM

## 2021-01-19 DIAGNOSIS — G25.81 RESTLESS LEGS: ICD-10-CM

## 2021-01-19 DIAGNOSIS — L30.9 DERMATITIS: Primary | ICD-10-CM

## 2021-01-19 DIAGNOSIS — E78.49 OTHER HYPERLIPIDEMIA: ICD-10-CM

## 2021-01-19 DIAGNOSIS — I48.0 PAROXYSMAL ATRIAL FIBRILLATION (HCC): ICD-10-CM

## 2021-01-19 DIAGNOSIS — R60.0 BILATERAL LOWER EXTREMITY EDEMA: ICD-10-CM

## 2021-01-19 DIAGNOSIS — G25.81 RESTLESS LEGS SYNDROME: ICD-10-CM

## 2021-01-19 DIAGNOSIS — G47.9 DIFFICULTY SLEEPING: ICD-10-CM

## 2021-01-19 PROCEDURE — 3078F DIAST BP <80 MM HG: CPT | Performed by: INTERNAL MEDICINE

## 2021-01-19 PROCEDURE — 3074F SYST BP LT 130 MM HG: CPT | Performed by: INTERNAL MEDICINE

## 2021-01-19 PROCEDURE — 1036F TOBACCO NON-USER: CPT | Performed by: INTERNAL MEDICINE

## 2021-01-19 PROCEDURE — 99214 OFFICE O/P EST MOD 30 MIN: CPT | Performed by: INTERNAL MEDICINE

## 2021-01-19 PROCEDURE — 1160F RVW MEDS BY RX/DR IN RCRD: CPT | Performed by: INTERNAL MEDICINE

## 2021-01-19 RX ORDER — BETAMETHASONE DIPROPIONATE 0.5 MG/G
CREAM TOPICAL 2 TIMES DAILY
Qty: 45 G | Refills: 0 | Status: SHIPPED | OUTPATIENT
Start: 2021-01-19 | End: 2022-01-10 | Stop reason: SDUPTHER

## 2021-01-19 RX ORDER — FUROSEMIDE 20 MG/1
20 TABLET ORAL DAILY PRN
Qty: 15 TABLET | Refills: 5 | Status: SHIPPED | OUTPATIENT
Start: 2021-01-19 | End: 2021-04-16

## 2021-01-19 RX ORDER — ROPINIROLE 0.5 MG/1
0.5 TABLET, FILM COATED ORAL DAILY
Qty: 60 TABLET | Refills: 5 | COMMUNITY
Start: 2021-01-19 | End: 2021-10-22

## 2021-01-19 NOTE — PROGRESS NOTES
Assessment/Plan:    Problem List Items Addressed This Visit        Cardiovascular and Mediastinum    Paroxysmal atrial fibrillation (HCC)     No palpitations and heart seems to be in a regular rhythm today  Continue metoprolol for rate control and Eliquis for stroke prevention  Relevant Orders    CBC       Other    Other hyperlipidemia    Relevant Orders    Lipid panel    Bilateral lower extremity edema     Continue to use the support stockings as needed for leg swelling  When he gets to warm to wear the stockings, use furosemide (water pill) 20 mg once per twice a week to keep swelling under control  Relevant Medications    furosemide (LASIX) 20 mg tablet    Other Relevant Orders    Basic metabolic panel    Restless legs     Controlled on 1 ropinirole daily  Other Visit Diagnoses     Dermatitis    -  Primary    Relevant Medications    betamethasone dipropionate (DIPROSONE) 0 05 % cream    Restless legs syndrome        Relevant Medications    rOPINIRole (REQUIP) 0 5 mg tablet    Difficulty sleeping        Relevant Medications    rOPINIRole (REQUIP) 0 5 mg tablet          Chief Complaint     Follow-up          Patient ID: Shannon Sanchez is a 66 y o  female who returns for routine follow up  She only took felodipine for 4 days because she got dizzy after a couple of days  She has been wearing the support stockings which has effective for controlling her lower extremity edema  She does not think she will be able to wear the stockings in the summer however and would consider taking furosemide as needed for edema  Her restless legs have improved and she did not actually increase her ropinirole after the last visit  She requested a refill of betamethasone dipropionate 0 05% that she uses for a recurrent rash on for abdomen  She does not have a rash at this time  She has not had any palpitations or any new stroke symptoms      Objective:    /72 (BP Location: Right arm, Patient Position: Sitting, Cuff Size: Standard)   Pulse 62   Temp 97 6 °F (36 4 °C)   Ht 5' 4" (1 626 m)   Wt 67 4 kg (148 lb 9 6 oz)   SpO2 97%   BMI 25 51 kg/m²     Wt Readings from Last 3 Encounters:   01/19/21 67 4 kg (148 lb 9 6 oz)   12/03/20 68 3 kg (150 lb 9 6 oz)   09/21/20 66 7 kg (147 lb)         Physical Exam    General:  Well-developed, well-nourished, in no acute distress  Cardiac:  Regular rate and rhythm, no murmur, gallop, or rub  There is no JVD or HJR  Lungs:  Clear to auscultation and percussion  Extremities:  No clubbing, cyanosis, or edema

## 2021-01-19 NOTE — ASSESSMENT & PLAN NOTE
Blood pressure is better controlled now  No need to restart the felodipine for now  Continue metoprolol for blood pressure

## 2021-01-19 NOTE — PATIENT INSTRUCTIONS
Please complete our patient survey and let us know about your experience today  Problem List Items Addressed This Visit        Cardiovascular and Mediastinum    Paroxysmal atrial fibrillation (HCC)     No palpitations and heart seems to be in a regular rhythm today  Continue metoprolol for rate control and Eliquis for stroke prevention  Relevant Orders    CBC       Other    Other hyperlipidemia    Relevant Orders    Lipid panel    Bilateral lower extremity edema     Continue to use the support stockings as needed for leg swelling  When he gets to warm to wear the stockings, use furosemide (water pill) 20 mg once per twice a week to keep swelling under control           Relevant Medications    furosemide (LASIX) 20 mg tablet    Other Relevant Orders    Basic metabolic panel      Other Visit Diagnoses     Dermatitis    -  Primary    Relevant Medications    betamethasone dipropionate (DIPROSONE) 0 05 % cream

## 2021-01-19 NOTE — ASSESSMENT & PLAN NOTE
No palpitations and heart seems to be in a regular rhythm today  Continue metoprolol for rate control and Eliquis for stroke prevention

## 2021-01-19 NOTE — ASSESSMENT & PLAN NOTE
Continue to use the support stockings as needed for leg swelling  When he gets to warm to wear the stockings, use furosemide (water pill) 20 mg once per twice a week to keep swelling under control

## 2021-01-25 ENCOUNTER — IMMUNIZATIONS (OUTPATIENT)
Dept: FAMILY MEDICINE CLINIC | Facility: HOSPITAL | Age: 76
End: 2021-01-25

## 2021-01-25 DIAGNOSIS — Z23 ENCOUNTER FOR IMMUNIZATION: Primary | ICD-10-CM

## 2021-01-25 PROCEDURE — 91301 SARS-COV-2 / COVID-19 MRNA VACCINE (MODERNA) 100 MCG: CPT

## 2021-01-25 PROCEDURE — 0011A SARS-COV-2 / COVID-19 MRNA VACCINE (MODERNA) 100 MCG: CPT

## 2021-02-12 DIAGNOSIS — E78.49 OTHER HYPERLIPIDEMIA: ICD-10-CM

## 2021-02-12 RX ORDER — ATORVASTATIN CALCIUM 40 MG/1
TABLET, FILM COATED ORAL
Qty: 90 TABLET | Refills: 1 | Status: SHIPPED | OUTPATIENT
Start: 2021-02-12 | End: 2021-07-28

## 2021-02-22 ENCOUNTER — IMMUNIZATIONS (OUTPATIENT)
Dept: FAMILY MEDICINE CLINIC | Facility: HOSPITAL | Age: 76
End: 2021-02-22

## 2021-02-22 DIAGNOSIS — Z23 ENCOUNTER FOR IMMUNIZATION: Primary | ICD-10-CM

## 2021-02-22 PROCEDURE — 91301 SARS-COV-2 / COVID-19 MRNA VACCINE (MODERNA) 100 MCG: CPT

## 2021-02-22 PROCEDURE — 0012A SARS-COV-2 / COVID-19 MRNA VACCINE (MODERNA) 100 MCG: CPT

## 2021-02-27 DIAGNOSIS — F51.04 CHRONIC INSOMNIA: ICD-10-CM

## 2021-03-01 ENCOUNTER — APPOINTMENT (EMERGENCY)
Dept: CT IMAGING | Facility: HOSPITAL | Age: 76
End: 2021-03-01
Payer: COMMERCIAL

## 2021-03-01 ENCOUNTER — HOSPITAL ENCOUNTER (EMERGENCY)
Facility: HOSPITAL | Age: 76
Discharge: HOME | End: 2021-03-01
Attending: EMERGENCY MEDICINE | Admitting: EMERGENCY MEDICINE
Payer: COMMERCIAL

## 2021-03-01 ENCOUNTER — TELEPHONE (OUTPATIENT)
Dept: FAMILY MEDICINE CLINIC | Facility: CLINIC | Age: 76
End: 2021-03-01

## 2021-03-01 VITALS
HEIGHT: 64 IN | BODY MASS INDEX: 25.89 KG/M2 | SYSTOLIC BLOOD PRESSURE: 195 MMHG | DIASTOLIC BLOOD PRESSURE: 86 MMHG | HEART RATE: 60 BPM | WEIGHT: 151.68 LBS | OXYGEN SATURATION: 98 % | RESPIRATION RATE: 16 BRPM | TEMPERATURE: 97.2 F

## 2021-03-01 DIAGNOSIS — I67.1 INTERNAL CAROTID ANEURYSM: ICD-10-CM

## 2021-03-01 DIAGNOSIS — H92.01 POSTERIOR AURICULAR PAIN OF RIGHT EAR: Primary | ICD-10-CM

## 2021-03-01 LAB
ALBUMIN SERPL BCP-MCNC: 3.1 G/DL (ref 3.5–5)
ALP SERPL-CCNC: 73 U/L (ref 46–116)
ALT SERPL W P-5'-P-CCNC: 16 U/L (ref 12–78)
ANION GAP SERPL CALCULATED.3IONS-SCNC: 5 MMOL/L (ref 4–13)
APTT PPP: 31 SECONDS (ref 23–37)
AST SERPL W P-5'-P-CCNC: 12 U/L (ref 5–45)
BASOPHILS # BLD AUTO: 0.05 THOUSANDS/ΜL (ref 0–0.1)
BASOPHILS NFR BLD AUTO: 1 % (ref 0–1)
BILIRUB SERPL-MCNC: 0.46 MG/DL (ref 0.2–1)
BUN SERPL-MCNC: 18 MG/DL (ref 5–25)
CALCIUM ALBUM COR SERPL-MCNC: 9.1 MG/DL (ref 8.3–10.1)
CALCIUM SERPL-MCNC: 8.4 MG/DL (ref 8.3–10.1)
CHLORIDE SERPL-SCNC: 104 MMOL/L (ref 100–108)
CO2 SERPL-SCNC: 30 MMOL/L (ref 21–32)
CREAT SERPL-MCNC: 0.9 MG/DL (ref 0.6–1.3)
CRP SERPL QL: 3.7 MG/L
EOSINOPHIL # BLD AUTO: 0.23 THOUSAND/ΜL (ref 0–0.61)
EOSINOPHIL NFR BLD AUTO: 4 % (ref 0–6)
ERYTHROCYTE [DISTWIDTH] IN BLOOD BY AUTOMATED COUNT: 13.4 % (ref 11.6–15.1)
ERYTHROCYTE [SEDIMENTATION RATE] IN BLOOD: 32 MM/HOUR (ref 0–29)
GFR SERPL CREATININE-BSD FRML MDRD: 62 ML/MIN/1.73SQ M
GLUCOSE SERPL-MCNC: 96 MG/DL (ref 65–140)
HCT VFR BLD AUTO: 40.3 % (ref 34.8–46.1)
HGB BLD-MCNC: 12.7 G/DL (ref 11.5–15.4)
IMM GRANULOCYTES # BLD AUTO: 0.01 THOUSAND/UL (ref 0–0.2)
IMM GRANULOCYTES NFR BLD AUTO: 0 % (ref 0–2)
INR PPP: 1.01 (ref 0.84–1.19)
LYMPHOCYTES # BLD AUTO: 1.66 THOUSANDS/ΜL (ref 0.6–4.47)
LYMPHOCYTES NFR BLD AUTO: 26 % (ref 14–44)
MCH RBC QN AUTO: 28 PG (ref 26.8–34.3)
MCHC RBC AUTO-ENTMCNC: 31.5 G/DL (ref 31.4–37.4)
MCV RBC AUTO: 89 FL (ref 82–98)
MONOCYTES # BLD AUTO: 0.55 THOUSAND/ΜL (ref 0.17–1.22)
MONOCYTES NFR BLD AUTO: 9 % (ref 4–12)
NEUTROPHILS # BLD AUTO: 3.92 THOUSANDS/ΜL (ref 1.85–7.62)
NEUTS SEG NFR BLD AUTO: 60 % (ref 43–75)
NRBC BLD AUTO-RTO: 0 /100 WBCS
PLATELET # BLD AUTO: 246 THOUSANDS/UL (ref 149–390)
PMV BLD AUTO: 10.3 FL (ref 8.9–12.7)
POTASSIUM SERPL-SCNC: 3.9 MMOL/L (ref 3.5–5.3)
PROT SERPL-MCNC: 7 G/DL (ref 6.4–8.2)
PROTHROMBIN TIME: 13.1 SECONDS (ref 11.6–14.5)
RBC # BLD AUTO: 4.54 MILLION/UL (ref 3.81–5.12)
SODIUM SERPL-SCNC: 139 MMOL/L (ref 136–145)
WBC # BLD AUTO: 6.42 THOUSAND/UL (ref 4.31–10.16)

## 2021-03-01 PROCEDURE — 85610 PROTHROMBIN TIME: CPT | Performed by: EMERGENCY MEDICINE

## 2021-03-01 PROCEDURE — 36415 COLL VENOUS BLD VENIPUNCTURE: CPT | Performed by: EMERGENCY MEDICINE

## 2021-03-01 PROCEDURE — G1004 CDSM NDSC: HCPCS

## 2021-03-01 PROCEDURE — 85652 RBC SED RATE AUTOMATED: CPT | Performed by: EMERGENCY MEDICINE

## 2021-03-01 PROCEDURE — 99284 EMERGENCY DEPT VISIT MOD MDM: CPT | Performed by: EMERGENCY MEDICINE

## 2021-03-01 PROCEDURE — 70481 CT ORBIT/EAR/FOSSA W/DYE: CPT

## 2021-03-01 PROCEDURE — 85025 COMPLETE CBC W/AUTO DIFF WBC: CPT | Performed by: EMERGENCY MEDICINE

## 2021-03-01 PROCEDURE — 85730 THROMBOPLASTIN TIME PARTIAL: CPT | Performed by: EMERGENCY MEDICINE

## 2021-03-01 PROCEDURE — 99284 EMERGENCY DEPT VISIT MOD MDM: CPT

## 2021-03-01 PROCEDURE — 80053 COMPREHEN METABOLIC PANEL: CPT | Performed by: EMERGENCY MEDICINE

## 2021-03-01 PROCEDURE — 96360 HYDRATION IV INFUSION INIT: CPT

## 2021-03-01 PROCEDURE — 86140 C-REACTIVE PROTEIN: CPT | Performed by: EMERGENCY MEDICINE

## 2021-03-01 RX ORDER — TRAZODONE HYDROCHLORIDE 50 MG/1
TABLET ORAL
Qty: 30 TABLET | Refills: 5 | Status: SHIPPED | OUTPATIENT
Start: 2021-03-01 | End: 2021-08-20

## 2021-03-01 RX ADMIN — SODIUM CHLORIDE 1000 ML: 0.9 INJECTION, SOLUTION INTRAVENOUS at 10:38

## 2021-03-01 RX ADMIN — IOHEXOL 85 ML: 350 INJECTION, SOLUTION INTRAVENOUS at 11:11

## 2021-03-01 NOTE — DISCHARGE INSTRUCTIONS
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

## 2021-03-01 NOTE — ED PROVIDER NOTES
History  Chief Complaint   Patient presents with    Headache     pt c/o pain behind the right ear when she touches head for past wk  pt mentioned having  2nd covid vaccine day before pain started  hx stroke  denies travel/sob/fever/cough/injury/trauma/visual changes  Patient complains of pain behind the right ear for the past 6 days  No trauma  No ear discharge  No change in hearing  No recent cough or cold symptoms  No fevers or chills  No nausea vomiting or diarrhea  No balance issues  Nothing taken for symptoms  Is on Eliquis  Called her family doctor referred to the ED for further evaluation  No headaches  No change in speech or vision  No focal weakness or numbness  No rash or lesions noted  History provided by:  Patient   used: No    Medical Problem  Location:  Right posterior auricular pain  Quality:  Achy  Severity:  Mild  Onset quality:  Gradual  Duration:  6 days  Timing:  Constant  Progression:  Unchanged  Chronicity:  New  Context:  Pain behind the right ear for the past 6 days  Relieved by:  Nothing  Worsened by:  Palpation  Ineffective treatments:  None tried  Associated symptoms: no abdominal pain, no chest pain, no congestion, no cough, no diarrhea, no ear pain, no fever, no headaches, no loss of consciousness, no nausea, no rash, no rhinorrhea, no shortness of breath, no sore throat, no vomiting and no wheezing        Prior to Admission Medications   Prescriptions Last Dose Informant Patient Reported? Taking?    Eliquis 5 MG   No No   Sig: take 1 tablet by mouth twice a day   Multiple Vitamins-Minerals (MULTIVITAMIN ADULT PO)   Yes No   Sig: Take 1 tablet by mouth daily   RA Hi Marcus 500-200 MG-UNIT per tablet   No No   Sig: take 1 tablet by mouth twice a day with meals   alendronate (FOSAMAX) 70 mg tablet   Yes No   Sig: Take 70 mg by mouth once a week   aspirin (ECOTRIN LOW STRENGTH) 81 mg EC tablet   Yes No   Sig: Take 81 mg by mouth daily   atorvastatin (LIPITOR) 40 mg tablet   No No   Sig: take 1 tablet by mouth daily   betamethasone dipropionate (DIPROSONE) 0 05 % cream   No No   Sig: Apply topically 2 (two) times a day   colchicine (COLCRYS) 0 6 mg tablet   Yes No   Sig: Take 0 6 mg by mouth daily   ergocalciferol (ERGOCALCIFEROL) 34726 units capsule   Yes No   Sig: Take 50,000 Units by mouth once a week   furosemide (LASIX) 20 mg tablet   No No   Sig: Take 1 tablet (20 mg total) by mouth daily as needed (leg swelling)   metoprolol tartrate (LOPRESSOR) 50 mg tablet   Yes No   Sig: Take 50 mg by mouth every 12 (twelve) hours     rOPINIRole (REQUIP) 0 5 mg tablet   Yes No   Sig: Take 1 tablet (0 5 mg total) by mouth daily   traMADol (ULTRAM) 50 mg tablet   Yes No   Sig: Take 50 mg by mouth 3 (three) times a day     traZODone (DESYREL) 50 mg tablet   No No   Sig: take 1 tablet by mouth at bedtime      Facility-Administered Medications: None       Past Medical History:   Diagnosis Date    Aneurysm ([facility] [address] )     Followed by Dr Shannon Jarrett in [place]  CTA  April 2018 Stable left C6 aneurysm    Arthritis     Atrial fibrillation (HCC)     Insomnia     Has been taking trazodone for sleep for at least a decade  No side effects  She can't sleep without the trazodone   Multinodular goiter 2016    Throid US confirmed multinodular goiter   [facility]roke [facility])        Past Surgical History:   Procedure Laterality Date    APPENDECTOMY      BUNIONECTOMY      CATARACT EXTRACTION W/ INTRAOCULAR LENS  IMPLANT, BILATERAL         Family History   Problem Relation Age of Onset    Cancer Mother     Stroke Mother     No Known Problems Father      I have reviewed and agree with the history as documented      E-Cigarette/Vaping    E-Cigarette Use Never User      E-Cigarette/Vaping Substances     Social History     Tobacco Use    Smoking status: Never Smoker    Smokeless tobacco: Never Used   Substance Use Topics    Alcohol use: No    Drug use: No       Review of Systems Constitutional: Negative for chills and fever  HENT: Negative for congestion, ear pain, hearing loss, rhinorrhea, sore throat, trouble swallowing and voice change  Eyes: Negative for pain and discharge  Respiratory: Negative for cough, shortness of breath and wheezing  Cardiovascular: Negative for chest pain and palpitations  Gastrointestinal: Negative for abdominal pain, blood in stool, constipation, diarrhea, nausea and vomiting  Genitourinary: Negative for dysuria, flank pain, frequency and hematuria  Musculoskeletal: Negative for joint swelling, neck pain and neck stiffness  Skin: Negative for rash and wound  Neurological: Negative for dizziness, seizures, loss of consciousness, syncope, facial asymmetry and headaches  Psychiatric/Behavioral: Negative for hallucinations, self-injury and suicidal ideas  All other systems reviewed and are negative  Physical Exam  Physical Exam  Vitals signs and nursing note reviewed  Constitutional:       General: She is not in acute distress  Appearance: She is well-developed  HENT:      Head: Normocephalic and atraumatic  Right Ear: External ear normal       Left Ear: External ear normal       Ears:      Comments: Tender right posterior auricular region  There is no swelling  There is no fluctuance or discharge  There is no adenopathy noted  Eyes:      Conjunctiva/sclera: Conjunctivae normal       Pupils: Pupils are equal, round, and reactive to light  Neck:      Musculoskeletal: Normal range of motion and neck supple  Cardiovascular:      Rate and Rhythm: Normal rate and regular rhythm  Heart sounds: Normal heart sounds  No murmur  Pulmonary:      Effort: Pulmonary effort is normal       Breath sounds: Normal breath sounds  No wheezing or rales  Abdominal:      General: Bowel sounds are normal  There is no distension  Palpations: Abdomen is soft  Tenderness: There is no abdominal tenderness   There is no guarding or rebound  Musculoskeletal: Normal range of motion  General: No deformity  Skin:     General: Skin is warm and dry  Findings: No rash  Neurological:      General: No focal deficit present  Mental Status: She is alert and oriented to person, place, and time  Cranial Nerves: No cranial nerve deficit     Psychiatric:         Behavior: Behavior normal          Vital Signs  ED Triage Vitals [03/01/21 1028]   Temperature Pulse Respirations Blood Pressure SpO2   (!) 97 2 °F (36 2 °C) 67 17 (!) 207/82 97 %      Temp Source Heart Rate Source Patient Position - Orthostatic VS BP Location FiO2 (%)   Temporal Monitor Lying Left arm --      Pain Score       4           Vitals:    03/01/21 1028   BP: (!) 207/82   Pulse: 67   Patient Position - Orthostatic VS: Lying         Visual Acuity  Visual Acuity      Most Recent Value   L Pupil Size (mm)  3   R Pupil Size (mm)  3          ED Medications  Medications   sodium chloride 0 9 % bolus 1,000 mL (0 mL Intravenous Stopped 3/1/21 1138)   iohexol (OMNIPAQUE) 350 MG/ML injection (SINGLE-DOSE) 85 mL (85 mL Intravenous Given 3/1/21 1111)       Diagnostic Studies  Results Reviewed     Procedure Component Value Units Date/Time    Protime-INR [[id number]]  (Normal) Collected: 03/01/21 1035    Lab Status: Final result Specimen: Blood from Arm, Right Updated: 03/01/21 1110     Protime 13 1 seconds      INR 1 01    APTT [[id number]]  (Normal) Collected: 03/01/21 1035    Lab Status: Final result Specimen: Blood from Arm, Right Updated: 03/01/21 1110     PTT 31 seconds     Comprehensive metabolic panel [[id number]]  (Abnormal) Collected: 03/01/21 1035    Lab Status: Final result Specimen: Blood from Arm, Right Updated: 03/01/21 1101     Sodium 139 mmol/L      Potassium 3 9 mmol/L      Chloride 104 mmol/L      CO2 30 mmol/L      ANION GAP 5 mmol/L      BUN 18 mg/dL      Creatinine 0 90 mg/dL      Glucose 96 mg/dL      Calcium 8 4 mg/dL      Corrected Calcium 9 1 mg/dL      AST 12 U/L      ALT 16 U/L      Alkaline Phosphatase 73 U/L      Total Protein 7 0 g/dL      Albumin 3 1 g/dL      Total Bilirubin 0 46 mg/dL      eGFR 62 ml/min/1 73sq m     Narrative:      [place] guidelines for Chronic Kidney Disease (CKD):     Stage 1 with normal or high GFR (GFR > 90 mL/min/1 73 square meters)    Stage 2 Mild CKD (GFR = 60-89 mL/min/1 73 square meters)    Stage 3A Moderate CKD (GFR = 45-59 mL/min/1 73 square meters)    Stage 3B Moderate CKD (GFR = 30-44 mL/min/1 73 square meters)    Stage 4 Severe CKD (GFR = 15-29 mL/min/1 73 square meters)    Stage 5 End Stage CKD (GFR <15 mL/min/1 73 square meters)  Note: GFR calculation is accurate only with a steady state creatinine    C-reactive protein [[id number]]  (Abnormal) Collected: 03/01/21 1035    Lab Status: Final result Specimen: Blood from Arm, Right Updated: 03/01/21 1101     CRP 3 7 mg/L     Sedimentation rate, automated [[id number]]  (Abnormal) Collected: 03/01/21 1035    Lab Status: Final result Specimen: Blood from Arm, Right Updated: 03/01/21 1046     Sed Rate 32 mm/hour     CBC and differential [[id number]] Collected: 03/01/21 1035    Lab Status: Final result Specimen: Blood from Arm, Right Updated: 03/01/21 1042     WBC 6 42 Thousand/uL      RBC 4 54 Million/uL      Hemoglobin 12 7 g/dL      Hematocrit 40 3 %      MCV 89 fL      MCH 28 0 pg      MCHC 31 5 g/dL      RDW 13 4 %      MPV 10 3 fL      Platelets 928 Thousands/uL      nRBC 0 /100 WBCs      Neutrophils Relative 60 %      Immat GRANS % 0 %      Lymphocytes Relative 26 %      Monocytes Relative 9 %      Eosinophils Relative 4 %      Basophils Relative 1 %      Neutrophils Absolute 3 92 Thousands/µL      Immature Grans Absolute 0 01 Thousand/uL      Lymphocytes Absolute 1 66 Thousands/µL      Monocytes Absolute 0 55 Thousand/µL      Eosinophils Absolute 0 23 Thousand/µL      Basophils Absolute 0 05 Thousands/µL                  CT orbits/temporal bones/skull base w contrast   Final Result by Silvio Razo DO (03/01 1155)      Unremarkable mastoid temporal bones bilaterally  The surrounding periauricular soft tissues are unremarkable  There is an incidentally noted 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 x 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 just above the ophthalmic artery and optic strut, best seen on series 301 image 88 and series 302 image 24  Recommend follow-up consultation with the    [facility], a division of [address] [place] [address] Neuroscience at [phone]  This examination was marked "immediate notification" in Epic in order to begin the standard process by which the radiology reading room liaison alerts the referring practitioner  Workstation performed: [id number]                    Procedures  Procedures         ED Course  ED Course as of Mar 01 1211   Mon Mar 01, 2021   1115 Blood pressure currently is 160/71  SBIRT 22yo+      Most Recent Value   SBIRT (22 yo +)   In order to provide better care to our patients, we are screening all of our patients for alcohol and drug use  Would it be okay to ask you these screening questions? Yes Filed at: 03/01/2021 1039   Initial Alcohol Screen: US AUDIT-C    1  How often do you have a drink containing alcohol?  0 Filed at: 03/01/2021 1039   2  How many drinks containing alcohol do you have on a typical day you are drinking? 0 Filed at: 03/01/2021 1039   3b  FEMALE Any Age, or MALE 65+: How often do you have 4 or more drinks on one occassion? 0 Filed at: 03/01/2021 1039   Audit-C Score  0 Filed at: 03/01/2021 1039   JELLY: How many times in the past year have you    Used an illegal drug or used a prescription medication for non-medical reasons?   Never Filed at: 03/01/2021 1039                    MDM  Number of Diagnoses or Management Options  Diagnosis management comments: Patient has been having constant pain for the past 6 days  It hurts to touch  No lesions that I see  Still could be early shingles  Patient was concerned that this is an aneurysm  Told that this does not sound as like an aneurysm and the pain has been constant for 6 days with no neurologic changes and the pain is reproducible to palpation  This would not occur if this were an aneurysm  CT scan results noted  Discussed with patient her CT scan results  Made aware of the left intracranial internal carotid artery aneurysm that is 4 mm in size  Patient has no headaches  No nausea vomiting  No neurologic changes  No change in vision  Patient is told to follow up with her family doctor for this  This does not account for the pain behind the right ear  She will keep night to see if a rash develops  Disposition  Final diagnoses:   Posterior auricular pain of right ear   Internal carotid aneurysm     Time reflects when diagnosis was documented in both MDM as applicable and the Disposition within this note     Time User Action Codes Description Comment    3/1/2021 12:03 PM Veronica Gutierres Add [H92 01] Posterior auricular pain of right ear     3/1/2021 12:03 PM Viridiana Erazo Junior Add [I67 1] Internal carotid aneurysm       ED Disposition     ED Disposition Condition Date/Time Comment    Discharge Stable Mon Mar 1, 2021 12:05 PM Heath Irwin discharge to home/self care  Follow-up Information     Follow up With Specialties Details Why Contact Info    Cate Dunbar MD Internal Medicine Call today  [address] [facility] [address] [address]  [phone]            Patient's Medications   Discharge Prescriptions    No medications on file     No discharge procedures on file      PDMP Review     None          ED Provider  Electronically Signed by           Shirlene Young MD  03/01/21 9289

## 2021-03-01 NOTE — TELEPHONE ENCOUNTER
Patient called and stated that she got her last COVID vaccine on 02/22/21 and ever since then her head has been hurting to the touch  She states that she is worried she might have aneurysm as the pain is just getting worse and worse  She states that the pain gets worse at night time  I advised patient to go to the ED for her to get the tested that would be needed to rule out the aneurysm

## 2021-03-30 ENCOUNTER — APPOINTMENT (OUTPATIENT)
Dept: LAB | Facility: CLINIC | Age: 76
End: 2021-03-30
Payer: COMMERCIAL

## 2021-03-30 ENCOUNTER — TRANSCRIBE ORDERS (OUTPATIENT)
Dept: ADMINISTRATIVE | Facility: HOSPITAL | Age: 76
End: 2021-03-30

## 2021-03-30 DIAGNOSIS — M81.8 IDIOPATHIC OSTEOPOROSIS: ICD-10-CM

## 2021-03-30 DIAGNOSIS — E78.5 HYPERLIPIDEMIA, UNSPECIFIED HYPERLIPIDEMIA TYPE: Primary | ICD-10-CM

## 2021-03-30 LAB
25(OH)D3 SERPL-MCNC: 34.1 NG/ML (ref 30–100)
CALCIUM SERPL-MCNC: 8.9 MG/DL (ref 8.3–10.1)
CREAT SERPL-MCNC: 1.05 MG/DL (ref 0.6–1.3)
GFR SERPL CREATININE-BSD FRML MDRD: 51 ML/MIN/1.73SQ M

## 2021-03-30 PROCEDURE — 82565 ASSAY OF CREATININE: CPT

## 2021-03-30 PROCEDURE — 82310 ASSAY OF CALCIUM: CPT

## 2021-03-30 PROCEDURE — 36415 COLL VENOUS BLD VENIPUNCTURE: CPT

## 2021-03-30 PROCEDURE — 82306 VITAMIN D 25 HYDROXY: CPT

## 2021-03-31 ENCOUNTER — APPOINTMENT (OUTPATIENT)
Dept: LAB | Facility: CLINIC | Age: 76
End: 2021-03-31
Payer: COMMERCIAL

## 2021-03-31 DIAGNOSIS — E78.5 HYPERLIPIDEMIA, UNSPECIFIED HYPERLIPIDEMIA TYPE: Primary | ICD-10-CM

## 2021-03-31 LAB
ANION GAP SERPL CALCULATED.3IONS-SCNC: 3 MMOL/L (ref 4–13)
AST SERPL W P-5'-P-CCNC: 16 U/L (ref 5–45)
BUN SERPL-MCNC: 18 MG/DL (ref 5–25)
CHLORIDE SERPL-SCNC: 106 MMOL/L (ref 100–108)
CHOLEST SERPL-MCNC: 115 MG/DL (ref 50–200)
CK SERPL-CCNC: 55 U/L (ref 26–192)
CO2 SERPL-SCNC: 30 MMOL/L (ref 21–32)
CREAT SERPL-MCNC: 0.79 MG/DL (ref 0.6–1.3)
GFR SERPL CREATININE-BSD FRML MDRD: 72 ML/MIN/1.73SQ M
HDLC SERPL-MCNC: 64 MG/DL
LDLC SERPL CALC-MCNC: 37 MG/DL (ref 0–100)
MAGNESIUM SERPL-MCNC: 2.3 MG/DL (ref 1.6–2.6)
NONHDLC SERPL-MCNC: 51 MG/DL
POTASSIUM SERPL-SCNC: 4.5 MMOL/L (ref 3.5–5.3)
SODIUM SERPL-SCNC: 139 MMOL/L (ref 136–145)
TRIGL SERPL-MCNC: 71 MG/DL
TSH SERPL DL<=0.05 MIU/L-ACNC: 1.7 UIU/ML (ref 0.36–3.74)

## 2021-03-31 PROCEDURE — 84443 ASSAY THYROID STIM HORMONE: CPT

## 2021-03-31 PROCEDURE — 80051 ELECTROLYTE PANEL: CPT

## 2021-03-31 PROCEDURE — 84520 ASSAY OF UREA NITROGEN: CPT

## 2021-03-31 PROCEDURE — 82550 ASSAY OF CK (CPK): CPT

## 2021-03-31 PROCEDURE — 80061 LIPID PANEL: CPT

## 2021-03-31 PROCEDURE — 84450 TRANSFERASE (AST) (SGOT): CPT

## 2021-03-31 PROCEDURE — 82565 ASSAY OF CREATININE: CPT

## 2021-03-31 PROCEDURE — 36415 COLL VENOUS BLD VENIPUNCTURE: CPT

## 2021-03-31 PROCEDURE — 83735 ASSAY OF MAGNESIUM: CPT

## 2021-04-16 DIAGNOSIS — R60.0 BILATERAL LOWER EXTREMITY EDEMA: ICD-10-CM

## 2021-04-16 RX ORDER — FUROSEMIDE 20 MG/1
TABLET ORAL
Qty: 15 TABLET | Refills: 5 | Status: SHIPPED | OUTPATIENT
Start: 2021-04-16 | End: 2021-07-29

## 2021-05-29 DIAGNOSIS — I48.0 PAROXYSMAL ATRIAL FIBRILLATION (HCC): ICD-10-CM

## 2021-05-29 RX ORDER — APIXABAN 5 MG/1
TABLET, FILM COATED ORAL
Qty: 60 TABLET | Refills: 5 | Status: SHIPPED | OUTPATIENT
Start: 2021-05-29 | End: 2021-11-19

## 2021-07-13 ENCOUNTER — OFFICE VISIT (OUTPATIENT)
Dept: FAMILY MEDICINE CLINIC | Facility: CLINIC | Age: 76
End: 2021-07-13
Payer: COMMERCIAL

## 2021-07-13 VITALS
OXYGEN SATURATION: 97 % | BODY MASS INDEX: 25.4 KG/M2 | HEART RATE: 55 BPM | WEIGHT: 148.8 LBS | DIASTOLIC BLOOD PRESSURE: 68 MMHG | SYSTOLIC BLOOD PRESSURE: 122 MMHG | HEIGHT: 64 IN

## 2021-07-13 DIAGNOSIS — Z11.59 NEED FOR HEPATITIS C SCREENING TEST: ICD-10-CM

## 2021-07-13 DIAGNOSIS — L57.0 AK (ACTINIC KERATOSIS): ICD-10-CM

## 2021-07-13 DIAGNOSIS — M81.8 IDIOPATHIC OSTEOPOROSIS: ICD-10-CM

## 2021-07-13 DIAGNOSIS — Z12.10 ENCOUNTER FOR SCREENING FOR MALIGNANT NEOPLASM OF INTESTINAL TRACT: Primary | ICD-10-CM

## 2021-07-13 DIAGNOSIS — Z12.31 ENCOUNTER FOR SCREENING MAMMOGRAM FOR MALIGNANT NEOPLASM OF BREAST: ICD-10-CM

## 2021-07-13 DIAGNOSIS — L30.9 DERMATITIS: ICD-10-CM

## 2021-07-13 PROCEDURE — 17000 DESTRUCT PREMALG LESION: CPT | Performed by: INTERNAL MEDICINE

## 2021-07-13 PROCEDURE — 3288F FALL RISK ASSESSMENT DOCD: CPT | Performed by: INTERNAL MEDICINE

## 2021-07-13 PROCEDURE — 1125F AMNT PAIN NOTED PAIN PRSNT: CPT | Performed by: INTERNAL MEDICINE

## 2021-07-13 PROCEDURE — 3725F SCREEN DEPRESSION PERFORMED: CPT | Performed by: INTERNAL MEDICINE

## 2021-07-13 PROCEDURE — 1160F RVW MEDS BY RX/DR IN RCRD: CPT | Performed by: INTERNAL MEDICINE

## 2021-07-13 PROCEDURE — 1036F TOBACCO NON-USER: CPT | Performed by: INTERNAL MEDICINE

## 2021-07-13 PROCEDURE — G0439 PPPS, SUBSEQ VISIT: HCPCS | Performed by: INTERNAL MEDICINE

## 2021-07-13 PROCEDURE — 1170F FXNL STATUS ASSESSED: CPT | Performed by: INTERNAL MEDICINE

## 2021-07-13 PROCEDURE — 99214 OFFICE O/P EST MOD 30 MIN: CPT | Performed by: INTERNAL MEDICINE

## 2021-07-13 RX ORDER — VALSARTAN 160 MG/1
160 TABLET ORAL DAILY
COMMUNITY
Start: 2021-06-04

## 2021-07-13 RX ORDER — AMLODIPINE BESYLATE 5 MG/1
5 TABLET ORAL DAILY
COMMUNITY
Start: 2021-06-07

## 2021-07-13 RX ORDER — GLUCOSAMINE/CHONDR SU A SOD 750-600 MG
1 TABLET ORAL DAILY
Qty: 90 TABLET | Refills: 1 | Status: SHIPPED | OUTPATIENT
Start: 2021-07-13

## 2021-07-13 NOTE — PROGRESS NOTES
Servando Broussard is here for her Subsequent Wellness visit  Last Medicare Wellness visit information reviewed, patient interviewed and updates made to the record today  Health Risk Assessment:   Patient rates overall health as good  Patient feels that their physical health rating is same  Patient is satisfied with their life  Eyesight was rated as same  Hearing was rated as same  Patient feels that their emotional and mental health rating is same  Patients states they are never, rarely angry  Patient states they are sometimes unusually tired/fatigued  Pain experienced in the last 7 days has been some  Patient's pain rating has been 2/10  Patient states that she has experienced no weight loss or gain in last 6 months  Depression Screening:   PHQ-2 Score: 0      Fall Risk Screening: In the past year, patient has experienced: no history of falling in past year      Urinary Incontinence Screening:   Patient has not leaked urine accidently in the last six months  Home Safety:  Patient has trouble with stairs inside or outside of their home  Patient has working smoke alarms and has working carbon monoxide detector  Home safety hazards include: none  Ever since the stroke she has to be careful when she walks on the stairs  She hasn't fallen  Nutrition:   Current diet is Regular  Medications:   Patient is currently taking over-the-counter supplements  OTC medications include: see medication list  Patient is able to manage medications  Activities of Daily Living (ADLs)/Instrumental Activities of Daily Living (IADLs):   Walk and transfer into and out of bed and chair?: Yes  Dress and groom yourself?: Yes    Bathe or shower yourself?: Yes    Feed yourself?  Yes  Do your laundry/housekeeping?: Yes  Manage your money, pay your bills and track your expenses?: Yes  Make your own meals?: Yes    Do your own shopping?: Yes    Previous Hospitalizations:   Any hospitalizations or ED visits within the last 12 months?: Yes    How many hospitalizations have you had in the last year?: 1-2    Advance Care Planning:   Living will: Yes    Durable POA for healthcare: Yes    Advanced directive: Yes    Five wishes given: Yes      Cognitive Screening:   Provider or family/friend/caregiver concerned regarding cognition?: No    PREVENTIVE SCREENINGS      Cardiovascular Screening:    General: Screening Not Indicated and History Lipid Disorder      Diabetes Screening:     General: Screening Current      Colorectal Cancer Screening:       Due for: Cologuard      Breast Cancer Screening:       Due for: Mammogram        Cervical Cancer Screening:    General: Screening Not Indicated      Osteoporosis Screening:    General: Screening Not Indicated and History Osteoporosis      Lung Cancer Screening:     General: Screening Not Indicated    Screening, Brief Intervention, and Referral to Treatment (SBIRT)    Screening  Typical number of drinks in a day: 0  Typical number of drinks in a week: 0  Interpretation: Low risk drinking behavior  AUDIT-C Screenin) How often did you have a drink containing alcohol in the past year? never  2) How many drinks did you have on a typical day when you were drinking in the past year? 0  3) How often did you have 6 or more drinks on one occasion in the past year? never    AUDIT-C Score: 0  Interpretation: Score 0-2 (female): Negative screen for alcohol misuse    Single Item Drug Screening:  How often have you used an illegal drug (including marijuana) or a prescription medication for non-medical reasons in the past year? never    Single Item Drug Screen Score: 0  Interpretation: Negative screen for possible drug use disorder    Review of Current Opioid Use    Opioid Risk Tool (ORT) Interpretation: Complete Opioid Risk Tool (ORT)    BMI Counseling: Body mass index is 25 54 kg/m²  The BMI is above normal  Nutrition recommendations include encouraging healthy choices of fruits and vegetables

## 2021-07-13 NOTE — ASSESSMENT & PLAN NOTE
Treat with betamethasone dipropionate  Apply to affected area once per day  If itching persists contact the office

## 2021-07-13 NOTE — PROGRESS NOTES
Assessment/Plan:      Diagnoses and all orders for this visit:    Encounter for screening for malignant neoplasm of intestinal tract  -     Cologuard; Future    Encounter for screening mammogram for malignant neoplasm of breast  -     Mammo screening bilateral w 3d & cad; Future    Need for hepatitis C screening test  -     Hepatitis C Antibody ([facility]); Future    Idiopathic osteoporosis  -     calcium-vitamin D (RA Hi Marcus) 500 mg-200 units per tablet; Take 1 tablet by mouth daily    Dermatitis       -      treat with betamethasone dipropionate  -      apply to affected area once per day  If itching persists contact the office  Actinic keratosis         -     lesion was treated with cryosurgery        -     patient was educated that she is able wash the area gently with soap and water    Other orders  -     amLODIPine (NORVASC) 5 mg tablet; 5 mg  -     Calcium Carbonate 1500 (600 Ca) MG TABS; 1,200 mg  -     valsartan (DIOVAN) 160 mg tablet; Take 160 mg by mouth daily          Subjective:     Patient ID: Michelle Singh is a 66 y o  female  Patient presents for a 6 month follow up visit  She reports that she has had an itchy rash on the back of her neck for about 1 week  She tried using hydrocortisone cream without relief and last night tried betamethasone dipropionate which had previously been prescribed to her and did help  She also is concerned about a lesion on the right side if her nose  She explains that the lesion had a silver scale on it and she scratched it off  It is now just red  Patient reports that when she had this same finding on the left side, cryosurgery helped  She wishes to have this done on the right side as well  Patient is more stiff in the hips but denies pain and recent falls  She had a vaginal hysterectomy 6/14/2021 and reports that she is recovering well  Patient denies stroke symtpoms and palpitations   She reports that she checks her legs for swelling and has not noticed any  The swelling is being controlled with furosemide 20mg  Patient is continuing to take Eliquis 5mg  Objective:     Physical Exam  Constitutional:       General: She is not in acute distress  Appearance: Normal appearance  Cardiovascular:      Rate and Rhythm: Normal rate and regular rhythm  Pulmonary:      Effort: Pulmonary effort is normal  No respiratory distress  Breath sounds: Normal breath sounds  No wheezing  Abdominal:      General: Bowel sounds are normal  There is no distension  Palpations: Abdomen is soft  There is no mass  Tenderness: There is no guarding  Musculoskeletal:         General: Normal range of motion  Right lower leg: No edema  Left lower leg: No edema  Skin:            Comments: Erythematous lesions on posterior neck near hairline   Neurological:      Mental Status: She is alert           Lisa PERES

## 2021-07-14 NOTE — PROGRESS NOTES
Lesion Destruction    Date/Time: 7/13/2021 8:02 PM  Performed by: Sebastien Fernandez MD  Authorized by: Sebastien Fernandez MD   Universal Protocol:  Consent: Verbal consent obtained  Procedure Details - Lesion Destruction:     Number of Lesions:  1  Lesion 1:     Body area:  Head/neck    Head/neck location:  Nose    Initial size (mm):  2    Final defect size (mm):  2    Malignancy: pre-malignant lesion      Destruction method: cryotherapy       Applied her Rapid Freeze on the lesion to the right side of her nose with a total freeze time of 45 seconds  She tolerated procedure well without complications  She was advised to keep the area clean and she may shower or bathe as needed

## 2021-07-26 ENCOUNTER — TELEPHONE (OUTPATIENT)
Dept: FAMILY MEDICINE CLINIC | Facility: CLINIC | Age: 76
End: 2021-07-26

## 2021-07-26 NOTE — TELEPHONE ENCOUNTER
Patient called and stated that she had an appointment with her neurologist Dr Mercedes Castellon and he stated that he would like you to increase patients BP medication  Patient also stated that she was told by the pharmacy that her medication RA HI Marcus has been discontinued

## 2021-07-27 NOTE — TELEPHONE ENCOUNTER
I don't have any documentation of what her blood pressure was  Calcium is available over the counter

## 2021-07-28 ENCOUNTER — APPOINTMENT (OUTPATIENT)
Dept: LAB | Facility: CLINIC | Age: 76
End: 2021-07-28
Payer: COMMERCIAL

## 2021-07-28 DIAGNOSIS — Z11.59 NEED FOR HEPATITIS C SCREENING TEST: ICD-10-CM

## 2021-07-28 DIAGNOSIS — E78.49 OTHER HYPERLIPIDEMIA: ICD-10-CM

## 2021-07-28 LAB — HCV AB SER QL: NORMAL

## 2021-07-28 PROCEDURE — 36415 COLL VENOUS BLD VENIPUNCTURE: CPT

## 2021-07-28 PROCEDURE — 86803 HEPATITIS C AB TEST: CPT

## 2021-07-28 RX ORDER — ATORVASTATIN CALCIUM 40 MG/1
TABLET, FILM COATED ORAL
Qty: 90 TABLET | Refills: 1 | Status: SHIPPED | OUTPATIENT
Start: 2021-07-28 | End: 2022-01-18

## 2021-07-30 NOTE — TELEPHONE ENCOUNTER
Spoke to patient she is aware of the calcium over the counter, I also called Dr Willy Duckworth office for last BP reading and it was 148/87  Dr Nahid Tafoya office is going to fax over last office visit

## 2021-08-20 DIAGNOSIS — F51.04 CHRONIC INSOMNIA: ICD-10-CM

## 2021-08-20 RX ORDER — TRAZODONE HYDROCHLORIDE 50 MG/1
TABLET ORAL
Qty: 30 TABLET | Refills: 5 | Status: SHIPPED | OUTPATIENT
Start: 2021-08-20 | End: 2022-06-06

## 2021-09-10 ENCOUNTER — HOSPITAL ENCOUNTER (OUTPATIENT)
Dept: RADIOLOGY | Facility: CLINIC | Age: 76
Discharge: HOME | End: 2021-09-10
Payer: COMMERCIAL

## 2021-09-10 VITALS — HEIGHT: 64 IN | BODY MASS INDEX: 25.27 KG/M2 | WEIGHT: 148 LBS

## 2021-09-10 DIAGNOSIS — Z12.31 ENCOUNTER FOR SCREENING MAMMOGRAM FOR MALIGNANT NEOPLASM OF BREAST: ICD-10-CM

## 2021-09-10 PROCEDURE — 77067 SCR MAMMO BI INCL CAD: CPT

## 2021-09-10 PROCEDURE — 77063 BREAST TOMOSYNTHESIS BI: CPT

## 2021-10-15 ENCOUNTER — CLINICAL SUPPORT (OUTPATIENT)
Dept: FAMILY MEDICINE CLINIC | Facility: CLINIC | Age: 76
End: 2021-10-15
Payer: COMMERCIAL

## 2021-10-15 VITALS
BODY MASS INDEX: 25.27 KG/M2 | HEART RATE: 85 BPM | OXYGEN SATURATION: 98 % | SYSTOLIC BLOOD PRESSURE: 122 MMHG | TEMPERATURE: 98.2 F | WEIGHT: 148 LBS | HEIGHT: 64 IN | DIASTOLIC BLOOD PRESSURE: 64 MMHG

## 2021-10-15 DIAGNOSIS — Z23 NEEDS FLU SHOT: Primary | ICD-10-CM

## 2021-10-15 PROCEDURE — 90662 IIV NO PRSV INCREASED AG IM: CPT

## 2021-10-15 PROCEDURE — G0008 ADMIN INFLUENZA VIRUS VAC: HCPCS

## 2021-10-22 DIAGNOSIS — G47.9 DIFFICULTY SLEEPING: ICD-10-CM

## 2021-10-22 DIAGNOSIS — G25.81 RESTLESS LEGS SYNDROME: ICD-10-CM

## 2021-10-22 RX ORDER — ROPINIROLE 0.5 MG/1
TABLET, FILM COATED ORAL
Qty: 60 TABLET | Refills: 5 | Status: SHIPPED | OUTPATIENT
Start: 2021-10-22 | End: 2022-07-07

## 2021-10-31 DIAGNOSIS — R60.0 BILATERAL LOWER EXTREMITY EDEMA: ICD-10-CM

## 2021-11-01 RX ORDER — FUROSEMIDE 20 MG/1
TABLET ORAL
Qty: 15 TABLET | Refills: 5 | Status: SHIPPED | OUTPATIENT
Start: 2021-11-01 | End: 2022-02-27

## 2021-11-19 DIAGNOSIS — I48.0 PAROXYSMAL ATRIAL FIBRILLATION (HCC): ICD-10-CM

## 2021-11-19 RX ORDER — APIXABAN 5 MG/1
TABLET, FILM COATED ORAL
Qty: 60 TABLET | Refills: 5 | Status: SHIPPED | OUTPATIENT
Start: 2021-11-19 | End: 2022-05-09

## 2022-01-10 ENCOUNTER — OFFICE VISIT (OUTPATIENT)
Dept: FAMILY MEDICINE CLINIC | Facility: CLINIC | Age: 77
End: 2022-01-10
Payer: COMMERCIAL

## 2022-01-10 VITALS
HEART RATE: 65 BPM | DIASTOLIC BLOOD PRESSURE: 80 MMHG | HEIGHT: 64 IN | WEIGHT: 151.4 LBS | OXYGEN SATURATION: 97 % | SYSTOLIC BLOOD PRESSURE: 130 MMHG | BODY MASS INDEX: 25.85 KG/M2 | TEMPERATURE: 97.5 F

## 2022-01-10 DIAGNOSIS — I63.10 CEREBROVASCULAR ACCIDENT (CVA) DUE TO EMBOLISM OF PRECEREBRAL ARTERY (HCC): ICD-10-CM

## 2022-01-10 DIAGNOSIS — I10 ESSENTIAL HYPERTENSION: Primary | ICD-10-CM

## 2022-01-10 DIAGNOSIS — E78.49 OTHER HYPERLIPIDEMIA: ICD-10-CM

## 2022-01-10 DIAGNOSIS — M81.8 IDIOPATHIC OSTEOPOROSIS: ICD-10-CM

## 2022-01-10 DIAGNOSIS — L30.9 DERMATITIS: ICD-10-CM

## 2022-01-10 DIAGNOSIS — I48.0 PAROXYSMAL ATRIAL FIBRILLATION (HCC): ICD-10-CM

## 2022-01-10 PROCEDURE — 99214 OFFICE O/P EST MOD 30 MIN: CPT | Performed by: INTERNAL MEDICINE

## 2022-01-10 PROCEDURE — 1036F TOBACCO NON-USER: CPT | Performed by: INTERNAL MEDICINE

## 2022-01-10 PROCEDURE — 1160F RVW MEDS BY RX/DR IN RCRD: CPT | Performed by: INTERNAL MEDICINE

## 2022-01-10 RX ORDER — BETAMETHASONE DIPROPIONATE 0.5 MG/G
CREAM TOPICAL 2 TIMES DAILY
Qty: 45 G | Refills: 0 | Status: SHIPPED | OUTPATIENT
Start: 2022-01-10

## 2022-01-10 NOTE — PROGRESS NOTES
Assessment/Plan:    Problem List Items Addressed This Visit        Cardiovascular and Mediastinum    Essential hypertension - Primary     Good blood pressure control on current regimen  Paroxysmal atrial fibrillation (HCC)     Rate is controlled on metoprolol  Continue Eliquis for stroke prophylaxis  Cerebrovascular accident (CVA) due to embolism of precerebral artery (HCC)     No recurrent stroke symptoms  Continue Eliquis  Musculoskeletal and Integument    Idiopathic osteoporosis     Off of alendronate secondary to nausea  Continue follow-up with rheumatology who was actually considering starting Reclast          Dermatitis    Relevant Medications    betamethasone dipropionate (DIPROSONE) 0 05 % cream       Other    Other hyperlipidemia     Remains on atorvastatin  Continue current regimen  Relevant Orders    Basic metabolic panel    Lipid panel          Chief Complaint     Follow-up          Patient ID: Naima Freeman is a 64 y o  female who returns for routine follow up  No palpitations or new stroke symptoms  She is no longer on alendronate  She stopped it because of nausea  She does follow up with rheumatology for her osteoporosis  The rheumatologist was considering starting Reclast in the future  Objective:    /80 (BP Location: Right arm, Patient Position: Sitting, Cuff Size: Standard)   Pulse 65   Temp 97 5 °F (36 4 °C)   Ht 5' 4" (1 626 m)   Wt 68 7 kg (151 lb 6 4 oz)   SpO2 97%   BMI 25 99 kg/m²     Wt Readings from Last 3 Encounters:   01/10/22 68 7 kg (151 lb 6 4 oz)   10/15/21 67 1 kg (148 lb)   09/10/21 67 1 kg (148 lb)         Physical Exam    General:  Well-developed, well-nourished, in no acute distress  Cardiac:  Regular rate and rhythm, no murmur, gallop, or rub  There is no JVD or HJR  Lungs:  Clear to auscultation and percussion    Abdomen:  Soft, nontender, normoactive bowel sounds, no palpable masses, no hepatosplenomegaly  Extremities:  No clubbing, cyanosis, or edema

## 2022-01-11 NOTE — ASSESSMENT & PLAN NOTE
Off of alendronate secondary to nausea    Continue follow-up with rheumatology who was actually considering starting Reclast

## 2022-01-18 DIAGNOSIS — E78.49 OTHER HYPERLIPIDEMIA: ICD-10-CM

## 2022-01-18 RX ORDER — ATORVASTATIN CALCIUM 40 MG/1
TABLET, FILM COATED ORAL
Qty: 90 TABLET | Refills: 1 | Status: SHIPPED | OUTPATIENT
Start: 2022-01-18 | End: 2022-07-13

## 2022-02-27 DIAGNOSIS — R60.0 BILATERAL LOWER EXTREMITY EDEMA: ICD-10-CM

## 2022-02-27 RX ORDER — FUROSEMIDE 20 MG/1
TABLET ORAL
Qty: 15 TABLET | Refills: 5 | Status: SHIPPED | OUTPATIENT
Start: 2022-02-27 | End: 2022-05-22

## 2022-03-01 ENCOUNTER — APPOINTMENT (OUTPATIENT)
Dept: LAB | Facility: CLINIC | Age: 77
End: 2022-03-01
Payer: COMMERCIAL

## 2022-03-01 DIAGNOSIS — I48.0 PAROXYSMAL ATRIAL FIBRILLATION (HCC): ICD-10-CM

## 2022-03-01 LAB
ANION GAP SERPL CALCULATED.3IONS-SCNC: 4 MMOL/L (ref 4–13)
BUN SERPL-MCNC: 18 MG/DL (ref 5–25)
CHLORIDE SERPL-SCNC: 108 MMOL/L (ref 100–108)
CO2 SERPL-SCNC: 27 MMOL/L (ref 21–32)
CREAT SERPL-MCNC: 0.96 MG/DL (ref 0.6–1.3)
ERYTHROCYTE [DISTWIDTH] IN BLOOD BY AUTOMATED COUNT: 13.3 % (ref 11.6–15.1)
GFR SERPL CREATININE-BSD FRML MDRD: 56 ML/MIN/1.73SQ M
HCT VFR BLD AUTO: 39.5 % (ref 34.8–46.1)
HGB BLD-MCNC: 12.6 G/DL (ref 11.5–15.4)
MAGNESIUM SERPL-MCNC: 2.3 MG/DL (ref 1.6–2.6)
MCH RBC QN AUTO: 28.3 PG (ref 26.8–34.3)
MCHC RBC AUTO-ENTMCNC: 31.9 G/DL (ref 31.4–37.4)
MCV RBC AUTO: 89 FL (ref 82–98)
PLATELET # BLD AUTO: 234 THOUSANDS/UL (ref 149–390)
PMV BLD AUTO: 11.4 FL (ref 8.9–12.7)
POTASSIUM SERPL-SCNC: 4.1 MMOL/L (ref 3.5–5.3)
RBC # BLD AUTO: 4.45 MILLION/UL (ref 3.81–5.12)
SODIUM SERPL-SCNC: 139 MMOL/L (ref 136–145)
TSH SERPL DL<=0.05 MIU/L-ACNC: 1.81 UIU/ML (ref 0.36–3.74)
WBC # BLD AUTO: 8.64 THOUSAND/UL (ref 4.31–10.16)

## 2022-03-01 PROCEDURE — 36415 COLL VENOUS BLD VENIPUNCTURE: CPT

## 2022-03-01 PROCEDURE — 84520 ASSAY OF UREA NITROGEN: CPT

## 2022-03-01 PROCEDURE — 82565 ASSAY OF CREATININE: CPT

## 2022-03-01 PROCEDURE — 83735 ASSAY OF MAGNESIUM: CPT

## 2022-03-01 PROCEDURE — 84443 ASSAY THYROID STIM HORMONE: CPT

## 2022-03-01 PROCEDURE — 85027 COMPLETE CBC AUTOMATED: CPT

## 2022-03-01 PROCEDURE — 80051 ELECTROLYTE PANEL: CPT

## 2022-03-15 ENCOUNTER — APPOINTMENT (OUTPATIENT)
Dept: LAB | Facility: CLINIC | Age: 77
End: 2022-03-15
Payer: COMMERCIAL

## 2022-03-15 DIAGNOSIS — Z79.899 ENCOUNTER FOR LONG-TERM (CURRENT) USE OF OTHER MEDICATIONS: Primary | ICD-10-CM

## 2022-03-15 DIAGNOSIS — E78.5 HYPERLIPIDEMIA, UNSPECIFIED HYPERLIPIDEMIA TYPE: ICD-10-CM

## 2022-03-15 DIAGNOSIS — R00.2 PALPITATIONS: ICD-10-CM

## 2022-03-15 LAB
ANION GAP SERPL CALCULATED.3IONS-SCNC: 4 MMOL/L (ref 4–13)
AST SERPL W P-5'-P-CCNC: 15 U/L (ref 5–45)
BUN SERPL-MCNC: 18 MG/DL (ref 5–25)
CHLORIDE SERPL-SCNC: 106 MMOL/L (ref 100–108)
CHOLEST SERPL-MCNC: 117 MG/DL
CK SERPL-CCNC: 47 U/L (ref 26–192)
CO2 SERPL-SCNC: 30 MMOL/L (ref 21–32)
CREAT SERPL-MCNC: 0.94 MG/DL (ref 0.6–1.3)
GFR SERPL CREATININE-BSD FRML MDRD: 58 ML/MIN/1.73SQ M
HDLC SERPL-MCNC: 63 MG/DL
LDLC SERPL CALC-MCNC: 39 MG/DL (ref 0–100)
MAGNESIUM SERPL-MCNC: 2.5 MG/DL (ref 1.6–2.6)
POTASSIUM SERPL-SCNC: 4.1 MMOL/L (ref 3.5–5.3)
SODIUM SERPL-SCNC: 140 MMOL/L (ref 136–145)
TRIGL SERPL-MCNC: 73 MG/DL
TSH SERPL DL<=0.05 MIU/L-ACNC: 2.38 UIU/ML (ref 0.36–3.74)

## 2022-03-15 PROCEDURE — 84520 ASSAY OF UREA NITROGEN: CPT

## 2022-03-15 PROCEDURE — 83735 ASSAY OF MAGNESIUM: CPT

## 2022-03-15 PROCEDURE — 36415 COLL VENOUS BLD VENIPUNCTURE: CPT

## 2022-03-15 PROCEDURE — 80061 LIPID PANEL: CPT

## 2022-03-15 PROCEDURE — 82550 ASSAY OF CK (CPK): CPT

## 2022-03-15 PROCEDURE — 80051 ELECTROLYTE PANEL: CPT

## 2022-03-15 PROCEDURE — 84450 TRANSFERASE (AST) (SGOT): CPT

## 2022-03-15 PROCEDURE — 82565 ASSAY OF CREATININE: CPT

## 2022-03-15 PROCEDURE — 84443 ASSAY THYROID STIM HORMONE: CPT

## 2022-05-08 DIAGNOSIS — I48.0 PAROXYSMAL ATRIAL FIBRILLATION (HCC): ICD-10-CM

## 2022-05-09 RX ORDER — APIXABAN 5 MG/1
TABLET, FILM COATED ORAL
Qty: 60 TABLET | Refills: 5 | Status: SHIPPED | OUTPATIENT
Start: 2022-05-09

## 2022-05-16 ENCOUNTER — OFFICE VISIT (OUTPATIENT)
Dept: FAMILY MEDICINE CLINIC | Facility: CLINIC | Age: 77
End: 2022-05-16
Payer: COMMERCIAL

## 2022-05-16 VITALS
SYSTOLIC BLOOD PRESSURE: 118 MMHG | OXYGEN SATURATION: 98 % | HEIGHT: 64 IN | DIASTOLIC BLOOD PRESSURE: 72 MMHG | WEIGHT: 148 LBS | HEART RATE: 66 BPM | BODY MASS INDEX: 25.27 KG/M2 | TEMPERATURE: 98 F

## 2022-05-16 DIAGNOSIS — K59.01 CONSTIPATION BY DELAYED COLONIC TRANSIT: Primary | ICD-10-CM

## 2022-05-16 DIAGNOSIS — R42 LIGHT-HEADED FEELING: ICD-10-CM

## 2022-05-16 PROCEDURE — 99213 OFFICE O/P EST LOW 20 MIN: CPT | Performed by: PHYSICIAN ASSISTANT

## 2022-05-16 PROCEDURE — 1160F RVW MEDS BY RX/DR IN RCRD: CPT | Performed by: PHYSICIAN ASSISTANT

## 2022-05-16 PROCEDURE — 1036F TOBACCO NON-USER: CPT | Performed by: PHYSICIAN ASSISTANT

## 2022-05-16 NOTE — PATIENT INSTRUCTIONS
Patient to have treatment for constipation with natural means  I would include plant based diet with lots of fruits and vegetables  Warm prune juice can also be helpful  Fruits such as strawberries blueberries cherries and grapes are very helpful  Having as many vegetables intake as possible including some cruciferous vegetables such as cauliflower and broccoli  Salads are fine to use as this increases the roughage  Limit some of the carbohydrates until the bowel function returns  Adequate water intake including water with green tea and coffee can be helpful  Would avoid stimulant laxatives as the body can become dependent upon them  I congratulate the patient on the healthy diet that she is taking at this point

## 2022-05-16 NOTE — PROGRESS NOTES
Assessment/Plan:    No problem-specific Assessment & Plan notes found for this encounter  Diagnoses and all orders for this visit:    Constipation by delayed colonic transit    Light-headed feeling    patient has been having issues with constipation over the last 1 week  Patient states that she was eating more carbohydrates over that time period and that ordinarily she has a high roughage diet with lots of fruits and vegetables  Initially, she responded to prune juice but then the constipation developed once again she took 2 cans of prune juice with severe pain when trying to move her stools  She is having pellet type stool production with a lot of cramping  After having the 2 cans of prune juice, the patient states that she then developed some diarrhea  This morning, the patient expressed very little stool  She is concerned that her constipation is starting again  As part of shared decision making, we decided that this simple approach to increasing her fresh fruits and vegetables and using as needed warm prune juice would be the best method to treat her constipation  Patient states that her diet is normally full of salads fruits and vegetables but she did digress with her diet and had a lot of carbohydrates  She is willing to try this and would like to avoid stimulant laxatives because of the long-term complications with the use of these agents  Patient is complaining of lightheadedness that occurs about 1 time per month and lasts about 4 hours when it occurs  She had no actual syncope or presyncope  Patient admits that she might not have had as much hydration when this lightheadedness occurred  She is taking diuretics and sometimes this can put her below some necessary fluid that she needs  She will pay particular attention to maintaining appropriate hydration in order to deal with the lightheadedness  Subjective:      Patient ID: Felisa Martinez is a 64 y o  female      HPI:  Patient has requested an acute care visit for ongoing constipation and to arrive at a joint treatment for this constipation  Patient has elected to have a diet with a lot of fresh fruits and vegetables and salads  When she follows this diet, constipation is less significant for her  Patient states that she has not had any dramatic weight changes and that she normally has a 3 lb fluctuation  Patient wants to avoid obstipation and constipation by natural means if possible  She states that when she is constipated, the stool that she produces is in small little pellets and appears to be very dry  We talked about the important role of adequate hydration in order to allow the stool to be well-hydrated and without excessive pressure to be passed  Patient also is drinking a lot of green tea  She has 1 cup of coffee per day  Her urine tends to be clear which does show her hydration status is adequate  She is also eating a lot of bread the contains seeds which helps at to the roughage in her diet  Patient states that her obstipation and pain related defecation was so severe that she was actually screaming  She states that this pain is similar to labor pain  She would rather avoid constipation rather than treated when it 1st occurs  Patient follows with Dr Mark Couch  She has an appointment with him on July 19th  A total of 29 minutes was spent rendering care for this patient  This time included review of the patient's electronic medical record, performing the history and physical, reviewing appropriate labs and/or images, developing a treatment and assessment plan, answering patient's questions and concerns, and documenting the patient visit      The following portions of the patient's history were reviewed and updated as appropriate: past family history, past social history, past surgical history and problem list     Review of Systems   Constitutional: Positive for appetite change (Patient states when she is constipated she has very little appetite and only eats half of which she normally eats  )  Negative for chills, diaphoresis, fever and unexpected weight change (Patient's weight fluctuates only 3 lb)  HENT: Negative for congestion, sore throat and trouble swallowing  Respiratory: Positive for shortness of breath  Negative for chest tightness  Cardiovascular: Negative for chest pain and leg swelling  Gastrointestinal: Positive for abdominal pain (Severe pain related to constipation ), constipation (High intake of carbohydrates has led to constipation and the reason for this visit ) and rectal pain (Obstipation causes severe rectal pain prior to movement of stool  )  Genitourinary: Negative for difficulty urinating, flank pain and frequency  Musculoskeletal: Negative for arthralgias and myalgias  Allergic/Immunologic: Negative for food allergies  Neurological: Positive for light-headedness (Patient is lightheaded about 1 time per month lasting about 4 hours per episode  This resolved spontaneously by the afternoon  )  Objective:      /72 (BP Location: Left arm, Patient Position: Sitting)   Pulse 66   Temp 98 °F (36 7 °C)   Ht 5' 4" (1 626 m)   Wt 67 1 kg (148 lb)   SpO2 98%   BMI 25 40 kg/m²          Physical Exam  well-developed well-nourished 75-year-old pleasant female who is alert oriented and cooperative with the examination  Patient's heart is fairly regular rate without murmur rub or gallops  PMI is not displaced  Lungs were clear to auscultation  Abdomen is flat and soft positive bowel sounds without tenderness masses organomegaly  No tenderness with even deep palpation  Extremities revealed minor varicosities in both lower extremities without edema  Dorsalis pedis and posterior tibialis pulses were palpable and intact

## 2022-05-22 DIAGNOSIS — R60.0 BILATERAL LOWER EXTREMITY EDEMA: ICD-10-CM

## 2022-05-22 RX ORDER — FUROSEMIDE 20 MG/1
TABLET ORAL
Qty: 15 TABLET | Refills: 5 | Status: SHIPPED | OUTPATIENT
Start: 2022-05-22

## 2022-06-04 DIAGNOSIS — F51.04 CHRONIC INSOMNIA: ICD-10-CM

## 2022-06-06 RX ORDER — TRAZODONE HYDROCHLORIDE 50 MG/1
TABLET ORAL
Qty: 30 TABLET | Refills: 5 | Status: SHIPPED | OUTPATIENT
Start: 2022-06-06

## 2022-07-07 DIAGNOSIS — G25.81 RESTLESS LEGS SYNDROME: ICD-10-CM

## 2022-07-07 DIAGNOSIS — G47.9 DIFFICULTY SLEEPING: ICD-10-CM

## 2022-07-07 RX ORDER — ROPINIROLE 0.5 MG/1
TABLET, FILM COATED ORAL
Qty: 60 TABLET | Refills: 5 | Status: SHIPPED | OUTPATIENT
Start: 2022-07-07

## 2022-07-11 ENCOUNTER — APPOINTMENT (OUTPATIENT)
Dept: LAB | Facility: CLINIC | Age: 77
End: 2022-07-11
Payer: COMMERCIAL

## 2022-07-11 DIAGNOSIS — E55.9 VITAMIN D DEFICIENCY, UNSPECIFIED: ICD-10-CM

## 2022-07-11 DIAGNOSIS — M81.8 IDIOPATHIC OSTEOPOROSIS: ICD-10-CM

## 2022-07-11 DIAGNOSIS — E78.49 OTHER HYPERLIPIDEMIA: ICD-10-CM

## 2022-07-11 DIAGNOSIS — Z79.899 ENCOUNTER FOR LONG-TERM (CURRENT) USE OF OTHER MEDICATIONS: ICD-10-CM

## 2022-07-11 LAB
25(OH)D3 SERPL-MCNC: 57.4 NG/ML (ref 30–100)
ALP SERPL-CCNC: 62 U/L (ref 46–116)
ANION GAP SERPL CALCULATED.3IONS-SCNC: 5 MMOL/L (ref 4–13)
BUN SERPL-MCNC: 18 MG/DL (ref 5–25)
CALCIUM SERPL-MCNC: 9.2 MG/DL (ref 8.3–10.1)
CHLORIDE SERPL-SCNC: 106 MMOL/L (ref 100–108)
CHOLEST SERPL-MCNC: 126 MG/DL
CO2 SERPL-SCNC: 28 MMOL/L (ref 21–32)
CREAT SERPL-MCNC: 1.01 MG/DL (ref 0.6–1.3)
GFR SERPL CREATININE-BSD FRML MDRD: 53 ML/MIN/1.73SQ M
GLUCOSE P FAST SERPL-MCNC: 97 MG/DL (ref 65–99)
HDLC SERPL-MCNC: 79 MG/DL
LDLC SERPL CALC-MCNC: 33 MG/DL (ref 0–100)
MAGNESIUM SERPL-MCNC: 2.3 MG/DL (ref 1.6–2.6)
NONHDLC SERPL-MCNC: 47 MG/DL
POTASSIUM SERPL-SCNC: 4 MMOL/L (ref 3.5–5.3)
PTH-INTACT SERPL-MCNC: 41.4 PG/ML (ref 18.4–80.1)
SODIUM SERPL-SCNC: 139 MMOL/L (ref 136–145)
TRIGL SERPL-MCNC: 70 MG/DL

## 2022-07-11 PROCEDURE — 80061 LIPID PANEL: CPT

## 2022-07-11 PROCEDURE — 83735 ASSAY OF MAGNESIUM: CPT

## 2022-07-11 PROCEDURE — 80048 BASIC METABOLIC PNL TOTAL CA: CPT

## 2022-07-11 PROCEDURE — 83970 ASSAY OF PARATHORMONE: CPT

## 2022-07-11 PROCEDURE — 36415 COLL VENOUS BLD VENIPUNCTURE: CPT

## 2022-07-11 PROCEDURE — 82306 VITAMIN D 25 HYDROXY: CPT

## 2022-07-11 PROCEDURE — 84075 ASSAY ALKALINE PHOSPHATASE: CPT

## 2022-07-12 ENCOUNTER — RA CDI HCC (OUTPATIENT)
Dept: OTHER | Facility: HOSPITAL | Age: 77
End: 2022-07-12

## 2022-07-13 DIAGNOSIS — E78.49 OTHER HYPERLIPIDEMIA: ICD-10-CM

## 2022-07-13 RX ORDER — ATORVASTATIN CALCIUM 40 MG/1
TABLET, FILM COATED ORAL
Qty: 90 TABLET | Refills: 3 | Status: SHIPPED | OUTPATIENT
Start: 2022-07-13

## 2022-07-13 NOTE — PROGRESS NOTES
[facility]  coding opportunities       Chart reviewed, no opportunity found:   [address]        Patients Insurance     Medicare Insurance: [employer]

## 2022-07-19 ENCOUNTER — OFFICE VISIT (OUTPATIENT)
Dept: FAMILY MEDICINE CLINIC | Facility: CLINIC | Age: 77
End: 2022-07-19
Payer: COMMERCIAL

## 2022-07-19 ENCOUNTER — APPOINTMENT (OUTPATIENT)
Dept: RADIOLOGY | Facility: CLINIC | Age: 77
End: 2022-07-19
Payer: COMMERCIAL

## 2022-07-19 VITALS
BODY MASS INDEX: 25.16 KG/M2 | HEART RATE: 57 BPM | SYSTOLIC BLOOD PRESSURE: 122 MMHG | OXYGEN SATURATION: 98 % | HEIGHT: 64 IN | TEMPERATURE: 97.3 F | WEIGHT: 147.4 LBS | DIASTOLIC BLOOD PRESSURE: 68 MMHG

## 2022-07-19 DIAGNOSIS — I48.0 PAROXYSMAL ATRIAL FIBRILLATION (HCC): ICD-10-CM

## 2022-07-19 DIAGNOSIS — N18.31 STAGE 3A CHRONIC KIDNEY DISEASE (HCC): ICD-10-CM

## 2022-07-19 DIAGNOSIS — I10 ESSENTIAL HYPERTENSION: ICD-10-CM

## 2022-07-19 DIAGNOSIS — G89.29 CHRONIC PAIN OF LEFT KNEE: ICD-10-CM

## 2022-07-19 DIAGNOSIS — E78.49 OTHER HYPERLIPIDEMIA: ICD-10-CM

## 2022-07-19 DIAGNOSIS — F11.20 CONTINUOUS OPIOID DEPENDENCE (HCC): Primary | ICD-10-CM

## 2022-07-19 DIAGNOSIS — M25.562 CHRONIC PAIN OF LEFT KNEE: ICD-10-CM

## 2022-07-19 PROCEDURE — 3725F SCREEN DEPRESSION PERFORMED: CPT | Performed by: INTERNAL MEDICINE

## 2022-07-19 PROCEDURE — 3078F DIAST BP <80 MM HG: CPT | Performed by: INTERNAL MEDICINE

## 2022-07-19 PROCEDURE — 1170F FXNL STATUS ASSESSED: CPT | Performed by: INTERNAL MEDICINE

## 2022-07-19 PROCEDURE — G0439 PPPS, SUBSEQ VISIT: HCPCS | Performed by: INTERNAL MEDICINE

## 2022-07-19 PROCEDURE — 1101F PT FALLS ASSESS-DOCD LE1/YR: CPT | Performed by: INTERNAL MEDICINE

## 2022-07-19 PROCEDURE — 99214 OFFICE O/P EST MOD 30 MIN: CPT | Performed by: INTERNAL MEDICINE

## 2022-07-19 PROCEDURE — 73562 X-RAY EXAM OF KNEE 3: CPT

## 2022-07-19 PROCEDURE — 3074F SYST BP LT 130 MM HG: CPT | Performed by: INTERNAL MEDICINE

## 2022-07-19 PROCEDURE — 1125F AMNT PAIN NOTED PAIN PRSNT: CPT | Performed by: INTERNAL MEDICINE

## 2022-07-19 PROCEDURE — 3288F FALL RISK ASSESSMENT DOCD: CPT | Performed by: INTERNAL MEDICINE

## 2022-07-19 PROCEDURE — 1160F RVW MEDS BY RX/DR IN RCRD: CPT | Performed by: INTERNAL MEDICINE

## 2022-07-19 NOTE — ASSESSMENT & PLAN NOTE
Lab Results   Component Value Date    EGFR 53 07/11/2022    EGFR 58 03/15/2022    EGFR 56 03/01/2022    CREATININE 1 01 07/11/2022    CREATININE 0 94 03/15/2022    CREATININE 0 96 03/01/2022   Creatinine is stable  Continue to monitor

## 2022-07-19 NOTE — ASSESSMENT & PLAN NOTE
She may have some arthritis in the left knee  Is also possible that she may have some locking of her knee which makes it necessary for her to sit down  Will start off with a x-ray    Consider ortho referral

## 2022-07-19 NOTE — ASSESSMENT & PLAN NOTE
Lab Results   Component Value Date    CHOLESTEROL 126 07/11/2022    TRIG 70 07/11/2022    HDL 79 07/11/2022    LDLCALC 33 07/11/2022    excellent lipid panel on atorvastatin

## 2022-07-19 NOTE — PROGRESS NOTES
Erik Francisco is here for her Subsequent Wellness visit  Last Medicare Wellness visit information reviewed, patient interviewed and updates made to the record today  Health Risk Assessment:   Patient rates overall health as good  Patient feels that their physical health rating is same  Patient is satisfied with their life  Eyesight was rated as same  Hearing was rated as same  Patient feels that their emotional and mental health rating is same  Patients states they are never, rarely angry  Patient states they are never, rarely unusually tired/fatigued  Pain experienced in the last 7 days has been none  Patient states that she has experienced no weight loss or gain in last 6 months  Depression Screening:   PHQ-2 Score: 0      Fall Risk Screening: In the past year, patient has experienced: no history of falling in past year      Urinary Incontinence Screening:   Patient has leaked urine accidently in the last six months  She thinks this is secondary to her diuretic which makes her get up to urinate at night  Home Safety:  Patient has trouble with stairs inside or outside of their home  Patient has working smoke alarms and has no working carbon monoxide detector  Home safety hazards include: none  Nutrition:   Current diet is Regular  Medications:   Patient is not currently taking any over-the-counter supplements  Patient is able to manage medications  Activities of Daily Living (ADLs)/Instrumental Activities of Daily Living (IADLs):   Walk and transfer into and out of bed and chair?: Yes  Dress and groom yourself?: Yes    Bathe or shower yourself?: Yes    Feed yourself?  Yes  Do your laundry/housekeeping?: Yes  Manage your money, pay your bills and track your expenses?: Yes  Make your own meals?: Yes    Do your own shopping?: Yes    Previous Hospitalizations:   Any hospitalizations or ED visits within the last 12 months?: No      Advance Care Planning:   Living will: Yes    Durable POA for healthcare: Yes    Advanced directive: Yes    Five wishes given: Yes      Cognitive Screening:   Provider or family/friend/caregiver concerned regarding cognition?: No    PREVENTIVE SCREENINGS      Cardiovascular Screening:    General: Screening Not Indicated and History Lipid Disorder      Diabetes Screening:     General: Screening Current      Colorectal Cancer Screening:     General: Screening Current      Breast Cancer Screening:     General: Screening Current      Cervical Cancer Screening:    General: Screening Not Indicated      Osteoporosis Screening:    General: Screening Not Indicated and History Osteoporosis      Abdominal Aortic Aneurysm (AAA) Screening:        General: Screening Not Indicated      Lung Cancer Screening:     General: Screening Not Indicated      Hepatitis C Screening:    General: Screening Current    Screening, Brief Intervention, and Referral to Treatment (SBIRT)    Screening  Typical number of drinks in a day: 0  Typical number of drinks in a week: 0  Interpretation: Low risk drinking behavior  AUDIT-C Screenin) How often did you have a drink containing alcohol in the past year? never  2) How many drinks did you have on a typical day when you were drinking in the past year? 0  3) How often did you have 6 or more drinks on one occasion in the past year? never    AUDIT-C Score: 0  Interpretation: Score 0-2 (female): Negative screen for alcohol misuse    Single Item Drug Screening:  How often have you used an illegal drug (including marijuana) or a prescription medication for non-medical reasons in the past year? never    Single Item Drug Screen Score: 0  Interpretation: Negative screen for possible drug use disorder    Review of Current Opioid Use    Opioid Risk Tool (ORT) Interpretation: Complete Opioid Risk Tool (ORT)    Assessment/Plan:    Continuous opioid dependence (HCC)  Takes tramadol 3 times a day for back pain   Sees rheumatologist     Essential hypertension  Good blood pressure control on current regimen  Stage 3a chronic kidney disease [facility])  Lab Results   Component Value Date    EGFR 53 07/11/2022    EGFR 58 03/15/2022    EGFR 56 03/01/2022    CREATININE 1 01 07/11/2022    CREATININE 0 94 03/15/2022    CREATININE 0 96 03/01/2022   Creatinine is stable  Continue to monitor  Paroxysmal atrial fibrillation (HCC)  I am concerned that she may still be having episodes of paroxysmal atrial fibrillation  I asked her to go back to see her cardiologist because they may be able to do a Zio or actually put in a loop recorder because it seems to be she is having incapacitating episodes  Other hyperlipidemia  Lab Results   Component Value Date    CHOLESTEROL 126 07/11/2022    TRIG 70 07/11/2022    HDL 79 07/11/2022    LDLCALC 33 07/11/2022    excellent lipid panel on atorvastatin  Chronic pain of left knee  She may have some arthritis in the left knee  Is also possible that she may have some locking of her knee which makes it necessary for her to sit down  Will start off with a x-ray  Consider ortho referral     BMI Counseling: Body mass index is 25 3 kg/m²  The BMI is above normal  Nutrition recommendations include encouraging healthy choices of fruits and vegetables  Rationale for BMI follow-up plan is due to patient being overweight or obese  Depression Screening and Follow-up Plan: Patient was screened for depression during today's encounter  They screened negative with a PHQ-2 score of 0  Chief Complaint     Medicare Wellness Visit; Care Gap Request          Patient ID: Marshal Gardner is a 64 y o  female who returns for routine follow up  She has been having episodes of lightheadedness for the past few months  She is fine when she is seated but she gets up and her head feels hot  She feels like she is going to pass out so she sits down    She saw her cardiologist at [facility], Dr Megan Lowe who ordered an echocardiogram that was normal   A 24 hour Holter was also normal but she did not have any of her symptoms during that time  She also reports that she had an episode where she had difficulty moving her left leg  She sometimes has pain in the left knee but other times just feels like she has to get off of her leg for a few minutes and then she can walk on it again  The symptoms only last a few minutes  She does not report any muscle weakness with these episodes  Objective:    /68 (BP Location: Left arm, Patient Position: Sitting, Cuff Size: Standard)   Pulse 57   Temp (!) 97 3 °F (36 3 °C)   Ht 5' 4" (1 626 m)   Wt 66 9 kg (147 lb 6 4 oz)   SpO2 98%   BMI 25 30 kg/m²     Wt Readings from Last 3 Encounters:   07/19/22 66 9 kg (147 lb 6 4 oz)   05/16/22 67 1 kg (148 lb)   01/10/22 68 7 kg (151 lb 6 4 oz)         Physical Exam    General:  Well-developed, well-nourished, in no acute distress  Cardiac:  Regular rate and rhythm, no murmur, gallop, or rub  There is no JVD or HJR  Lungs:  Clear to auscultation and percussion  Abdomen:  Soft, nontender, normoactive bowel sounds, no palpable masses, no hepatosplenomegaly  Extremities:  No clubbing, cyanosis, or edema  Neurologic:  Motor was 5/5 in all major groups, she had a little bit difficulty getting on and off the exam table but her gait was normal   Musculoskeletal, left knee flexion was normal   There was valgus and varus laxity and she did have some discomfort with manipulation of the left knee  No effusion appreciated

## 2022-07-19 NOTE — ASSESSMENT & PLAN NOTE
I am concerned that she may still be having episodes of paroxysmal atrial fibrillation  I asked her to go back to see her cardiologist because they may be able to do a Zio or actually put in a loop recorder because it seems to be she is having incapacitating episodes

## 2022-08-27 DIAGNOSIS — R60.0 BILATERAL LOWER EXTREMITY EDEMA: ICD-10-CM

## 2022-08-28 RX ORDER — FUROSEMIDE 20 MG/1
TABLET ORAL
Qty: 15 TABLET | Refills: 5 | Status: SHIPPED | OUTPATIENT
Start: 2022-08-28

## 2022-09-11 ENCOUNTER — TELEPHONE (OUTPATIENT)
Dept: OTHER | Facility: OTHER | Age: 77
End: 2022-09-11

## 2022-11-12 DIAGNOSIS — I48.0 PAROXYSMAL ATRIAL FIBRILLATION (HCC): ICD-10-CM

## 2022-11-14 RX ORDER — APIXABAN 5 MG/1
TABLET, FILM COATED ORAL
Qty: 60 TABLET | Refills: 5 | Status: SHIPPED | OUTPATIENT
Start: 2022-11-14

## 2022-12-08 DIAGNOSIS — R60.0 BILATERAL LOWER EXTREMITY EDEMA: ICD-10-CM

## 2022-12-08 RX ORDER — FUROSEMIDE 20 MG/1
TABLET ORAL
Qty: 15 TABLET | Refills: 5 | Status: SHIPPED | OUTPATIENT
Start: 2022-12-08

## 2022-12-10 DIAGNOSIS — F51.04 CHRONIC INSOMNIA: ICD-10-CM

## 2022-12-12 RX ORDER — TRAZODONE HYDROCHLORIDE 50 MG/1
TABLET ORAL
Qty: 30 TABLET | Refills: 5 | Status: SHIPPED | OUTPATIENT
Start: 2022-12-12

## 2023-01-11 DIAGNOSIS — G25.81 RESTLESS LEGS SYNDROME: ICD-10-CM

## 2023-01-11 DIAGNOSIS — G47.9 DIFFICULTY SLEEPING: ICD-10-CM

## 2023-01-11 RX ORDER — ROPINIROLE 0.5 MG/1
TABLET, FILM COATED ORAL
Qty: 60 TABLET | Refills: 5 | Status: SHIPPED | OUTPATIENT
Start: 2023-01-11

## 2023-01-24 ENCOUNTER — OFFICE VISIT (OUTPATIENT)
Dept: FAMILY MEDICINE CLINIC | Facility: CLINIC | Age: 78
End: 2023-01-24

## 2023-01-24 VITALS
TEMPERATURE: 97.5 F | BODY MASS INDEX: 25.88 KG/M2 | HEIGHT: 64 IN | HEART RATE: 59 BPM | SYSTOLIC BLOOD PRESSURE: 129 MMHG | DIASTOLIC BLOOD PRESSURE: 61 MMHG | WEIGHT: 151.6 LBS | OXYGEN SATURATION: 95 %

## 2023-01-24 DIAGNOSIS — M46.1 INFLAMMATION OF SACROILIAC JOINT (HCC): ICD-10-CM

## 2023-01-24 DIAGNOSIS — I10 ESSENTIAL HYPERTENSION: ICD-10-CM

## 2023-01-24 DIAGNOSIS — Z12.31 ENCOUNTER FOR SCREENING MAMMOGRAM FOR BREAST CANCER: Primary | ICD-10-CM

## 2023-01-24 DIAGNOSIS — L57.0 ACTINIC KERATOSIS: ICD-10-CM

## 2023-01-24 DIAGNOSIS — F11.20 CONTINUOUS OPIOID DEPENDENCE (HCC): ICD-10-CM

## 2023-01-24 DIAGNOSIS — N18.31 STAGE 3A CHRONIC KIDNEY DISEASE (HCC): ICD-10-CM

## 2023-01-24 DIAGNOSIS — E78.49 OTHER HYPERLIPIDEMIA: ICD-10-CM

## 2023-01-24 DIAGNOSIS — Z23 NEEDS FLU SHOT: ICD-10-CM

## 2023-01-24 DIAGNOSIS — I48.0 PAROXYSMAL ATRIAL FIBRILLATION (HCC): ICD-10-CM

## 2023-01-24 DIAGNOSIS — Z23 NEED FOR SHINGLES VACCINE: ICD-10-CM

## 2023-01-24 PROBLEM — M81.0 AGE-RELATED OSTEOPOROSIS WITHOUT CURRENT PATHOLOGICAL FRACTURE: Status: ACTIVE | Noted: 2023-01-24

## 2023-01-24 PROBLEM — H53.8 BLURRY VISION: Status: ACTIVE | Noted: 2023-01-24

## 2023-01-24 PROBLEM — K21.9 GASTROESOPHAGEAL REFLUX DISEASE WITHOUT ESOPHAGITIS: Status: ACTIVE | Noted: 2023-01-24

## 2023-01-24 RX ORDER — ZOSTER VACCINE RECOMBINANT, ADJUVANTED 50 MCG/0.5
0.5 KIT INTRAMUSCULAR ONCE
Qty: 1 EACH | Refills: 1 | Status: SHIPPED | OUTPATIENT
Start: 2023-01-24 | End: 2023-01-24

## 2023-01-24 NOTE — ASSESSMENT & PLAN NOTE
She did take alendronate and her most recent DEXA done by her rheumatologist showed that her bone density had improved into the osteopenia range  She is off treatment for now  Continue follow-up with rheumatology

## 2023-01-24 NOTE — ASSESSMENT & PLAN NOTE
It seems plausible that her current right lower back pain is actually related to the previous sacroiliac injection as it seemed of started a week after the injection which was right in the same place  I recommended she follow-up with rheumatology for this

## 2023-01-24 NOTE — ASSESSMENT & PLAN NOTE
She is on a stable dose of tramadol to treat her chronic low back pain  Continue follow-up with rheumatology

## 2023-01-24 NOTE — PROGRESS NOTES
Assessment/Plan:    Gastroesophageal reflux disease without esophagitis  Her symptoms are consistent with reflux  I offered her a course of omeprazole but she is going to stick with milk for now  Continuous opioid dependence ([address] )  She is on a stable dose of tramadol to treat her chronic low back pain  Continue follow-up with rheumatology  Essential hypertension  Good blood pressure control on current regimen  Paroxysmal atrial fibrillation (HCC)  She has not had any palpitations  Continue current regimen and follow-up with cardiology  Blurry vision  Unclear etiology  I do not think that this was a stroke or a TIA  It also does not sound like vertigo  Her neuro exam here today was unremarkable  I encouraged her to see her eye doctor to make sure she has not had any change in her vision or her retinas  If it recurs, we may need neuroimaging  Inflammation of sacroiliac joint ([address] )  It seems plausible that her current right lower back pain is actually related to the previous sacroiliac injection as it seemed of started a week after the injection which was right in the same place  I recommended she follow-up with rheumatology for this  Age-related osteoporosis without current pathological fracture  She did take alendronate and her most recent DEXA done by her rheumatologist showed that her bone density had improved into the osteopenia range  She is off treatment for now  Continue follow-up with rheumatology  Chief Complaint    Follow-up; Care Gap Request         Patient ID: Dc Barakat is a 76 y o  female who returns for routine follow up  She stopped the furosemide after the swelling resolved  Back in the summer, she had two episodes of vomiting of bilious fluid  She had no antecedent abdominal or any systemic symptoms  She does sometimes get waterbrash she usually drinks milk for this which helps  She used to take Tums  As long as she has milk daily, she doesn't get waterbrash  She had two days of blurry vision about 5 days ago  She woke up that way after a mostly sleepless night  She also seemed to have to concentrate more on her speech though it was fluid and intelligible  She hadn't noted any palpations  It resolved and hasn't recurred  For the past month, she has had right lumbosacral pain that she describes as tightness  She hasn't taken anything for it  She had an SI joint on 12/13 right in the same area  Objective:    /61 (BP Location: Left arm, Patient Position: Sitting, Cuff Size: Adult)   Pulse 59   Temp 97 5 °F (36 4 °C)   Ht 5' 4" (1 626 m)   Wt 68 8 kg (151 lb 9 6 oz)   SpO2 95%   BMI 26 02 kg/m²     Wt Readings from Last 3 Encounters:   01/24/23 68 8 kg (151 lb 9 6 oz)   07/19/22 66 9 kg (147 lb 6 4 oz)   05/16/22 67 1 kg (148 lb)         Physical Exam    General:  Well-developed, well-nourished, in no acute distress  Cardiac:  Regular rate and rhythm, no murmur, gallop, or rub  There is no JVD or HJR  Lungs:  Clear to auscultation and percussion  Abdomen:  Soft, nontender, normoactive bowel sounds, no palpable masses, no hepatosplenomegaly  Extremities:  No clubbing, cyanosis, or edema  Neurologic: Cranial nerves II-XII intact, visual fields were intact to confrontation, motor was 5/5 in all major groups  Musculoskeletal: She has mild tenderness to palpation overlying the right sacroiliac region and there is a fading ecchymosis noted here  Skin: She has a 2 mm scaly lesion on an erythematous base on the left nasal bridge and another on the right nasal bridge      Lesion Destruction    Date/Time: 1/24/2023 2:49 PM  Performed by: Heather Talavera MD  Authorized by: Heather Talavera MD     Procedure Details - Lesion Destruction:     Number of Lesions:  2  Lesion 1:     Body area:  Head/neck    Head/neck location:  Nose    Initial size (mm):  2    Final defect size (mm):  2    Malignancy: pre-malignant lesion      Destruction method: cryotherapy Lesion 2:     Body area:  Head/neck    Head/neck location:  Nose    Initial size (mm):  2    Final defect size (mm):  2    Malignancy: pre-malignant lesion      Destruction method: cryotherapy       I applied rapid freeze to each lesion on either side of her nose with a total freeze time of 20 seconds  She tolerated this without difficulty

## 2023-01-24 NOTE — ASSESSMENT & PLAN NOTE
Her symptoms are consistent with reflux  I offered her a course of omeprazole but she is going to stick with milk for now

## 2023-01-24 NOTE — ASSESSMENT & PLAN NOTE
Unclear etiology  I do not think that this was a stroke or a TIA  It also does not sound like vertigo  Her neuro exam here today was unremarkable  I encouraged her to see her eye doctor to make sure she has not had any change in her vision or her retinas  If it recurs, we may need neuroimaging

## 2023-04-05 ENCOUNTER — TELEPHONE (OUTPATIENT)
Dept: FAMILY MEDICINE CLINIC | Facility: CLINIC | Age: 78
End: 2023-04-05

## 2023-04-05 ENCOUNTER — TRANSITIONAL CARE MANAGEMENT (OUTPATIENT)
Dept: FAMILY MEDICINE CLINIC | Facility: CLINIC | Age: 78
End: 2023-04-05

## 2023-06-07 ENCOUNTER — APPOINTMENT (OUTPATIENT)
Dept: LAB | Facility: CLINIC | Age: 78
End: 2023-06-07
Payer: COMMERCIAL

## 2023-06-07 DIAGNOSIS — E78.5 HYPERLIPIDEMIA, UNSPECIFIED HYPERLIPIDEMIA TYPE: ICD-10-CM

## 2023-06-07 DIAGNOSIS — R00.2 PALPITATIONS: ICD-10-CM

## 2023-06-07 LAB
ANION GAP SERPL CALCULATED.3IONS-SCNC: 5 MMOL/L (ref 4–13)
AST SERPL W P-5'-P-CCNC: 21 U/L (ref 5–45)
BUN SERPL-MCNC: 22 MG/DL (ref 5–25)
CHLORIDE SERPL-SCNC: 108 MMOL/L (ref 96–108)
CHOLEST SERPL-MCNC: 159 MG/DL
CK SERPL-CCNC: 69 U/L (ref 26–192)
CO2 SERPL-SCNC: 26 MMOL/L (ref 21–32)
CREAT SERPL-MCNC: 1.11 MG/DL (ref 0.6–1.3)
GFR SERPL CREATININE-BSD FRML MDRD: 47 ML/MIN/1.73SQ M
HDLC SERPL-MCNC: 75 MG/DL
LDLC SERPL CALC-MCNC: 66 MG/DL (ref 0–100)
MAGNESIUM SERPL-MCNC: 2.3 MG/DL (ref 1.6–2.6)
NONHDLC SERPL-MCNC: 84 MG/DL
POTASSIUM SERPL-SCNC: 4.1 MMOL/L (ref 3.5–5.3)
SODIUM SERPL-SCNC: 139 MMOL/L (ref 135–147)
TRIGL SERPL-MCNC: 88 MG/DL
TSH SERPL DL<=0.05 MIU/L-ACNC: 2.43 UIU/ML (ref 0.45–4.5)

## 2023-06-07 PROCEDURE — 84520 ASSAY OF UREA NITROGEN: CPT

## 2023-06-07 PROCEDURE — 82550 ASSAY OF CK (CPK): CPT

## 2023-06-07 PROCEDURE — 80051 ELECTROLYTE PANEL: CPT

## 2023-06-07 PROCEDURE — 82565 ASSAY OF CREATININE: CPT

## 2023-06-07 PROCEDURE — 84443 ASSAY THYROID STIM HORMONE: CPT

## 2023-06-07 PROCEDURE — 80061 LIPID PANEL: CPT

## 2023-06-07 PROCEDURE — 36415 COLL VENOUS BLD VENIPUNCTURE: CPT

## 2023-06-07 PROCEDURE — 84450 TRANSFERASE (AST) (SGOT): CPT

## 2023-06-07 PROCEDURE — 83735 ASSAY OF MAGNESIUM: CPT

## 2023-06-28 ENCOUNTER — APPOINTMENT (OUTPATIENT)
Dept: LAB | Facility: CLINIC | Age: 78
End: 2023-06-28
Payer: COMMERCIAL

## 2023-06-28 DIAGNOSIS — Z79.899 OTHER LONG TERM (CURRENT) DRUG THERAPY: ICD-10-CM

## 2023-06-28 DIAGNOSIS — M81.0 AGE-RELATED OSTEOPOROSIS WITHOUT CURRENT PATHOLOGICAL FRACTURE: ICD-10-CM

## 2023-06-28 LAB
25(OH)D3 SERPL-MCNC: 35.4 NG/ML (ref 30–100)
ALBUMIN SERPL BCP-MCNC: 3.4 G/DL (ref 3.5–5)
ALP SERPL-CCNC: 87 U/L (ref 46–116)
ALT SERPL W P-5'-P-CCNC: 22 U/L (ref 12–78)
ANION GAP SERPL CALCULATED.3IONS-SCNC: 4 MMOL/L
AST SERPL W P-5'-P-CCNC: 19 U/L (ref 5–45)
BILIRUB SERPL-MCNC: 0.53 MG/DL (ref 0.2–1)
BUN SERPL-MCNC: 22 MG/DL (ref 5–25)
CALCIUM ALBUM COR SERPL-MCNC: 9.9 MG/DL (ref 8.3–10.1)
CALCIUM SERPL-MCNC: 9.4 MG/DL (ref 8.3–10.1)
CHLORIDE SERPL-SCNC: 106 MMOL/L (ref 96–108)
CO2 SERPL-SCNC: 29 MMOL/L (ref 21–32)
CREAT SERPL-MCNC: 1.01 MG/DL (ref 0.6–1.3)
GFR SERPL CREATININE-BSD FRML MDRD: 53 ML/MIN/1.73SQ M
GLUCOSE SERPL-MCNC: 89 MG/DL (ref 65–140)
POTASSIUM SERPL-SCNC: 4.2 MMOL/L (ref 3.5–5.3)
PROT SERPL-MCNC: 7.3 G/DL (ref 6.4–8.4)
PTH-INTACT SERPL-MCNC: 28.7 PG/ML (ref 12–88)
SODIUM SERPL-SCNC: 139 MMOL/L (ref 135–147)

## 2023-06-28 PROCEDURE — 80053 COMPREHEN METABOLIC PANEL: CPT

## 2023-06-28 PROCEDURE — 84165 PROTEIN E-PHORESIS SERUM: CPT

## 2023-06-28 PROCEDURE — 36415 COLL VENOUS BLD VENIPUNCTURE: CPT

## 2023-06-28 PROCEDURE — 82306 VITAMIN D 25 HYDROXY: CPT

## 2023-06-28 PROCEDURE — 83970 ASSAY OF PARATHORMONE: CPT

## 2023-06-29 LAB
ALBUMIN SERPL ELPH-MCNC: 3.86 G/DL (ref 3.2–5.1)
ALBUMIN SERPL ELPH-MCNC: 54.3 % (ref 48–70)
ALPHA1 GLOB SERPL ELPH-MCNC: 0.36 G/DL (ref 0.15–0.47)
ALPHA1 GLOB SERPL ELPH-MCNC: 5 % (ref 1.8–7)
ALPHA2 GLOB SERPL ELPH-MCNC: 0.83 G/DL (ref 0.42–1.04)
ALPHA2 GLOB SERPL ELPH-MCNC: 11.7 % (ref 5.9–14.9)
BETA GLOB ABNORMAL SERPL ELPH-MCNC: 0.46 G/DL (ref 0.31–0.57)
BETA1 GLOB SERPL ELPH-MCNC: 6.5 % (ref 4.7–7.7)
BETA2 GLOB SERPL ELPH-MCNC: 5.3 % (ref 3.1–7.9)
BETA2+GAMMA GLOB SERPL ELPH-MCNC: 0.38 G/DL (ref 0.2–0.58)
GAMMA GLOB ABNORMAL SERPL ELPH-MCNC: 1.22 G/DL (ref 0.4–1.66)
GAMMA GLOB SERPL ELPH-MCNC: 17.2 % (ref 6.9–22.3)
IGG/ALB SER: 1.19 {RATIO} (ref 1.1–1.8)
PROT PATTERN SERPL ELPH-IMP: NORMAL
PROT SERPL-MCNC: 7.1 G/DL (ref 6.4–8.2)

## 2023-06-29 PROCEDURE — 84165 PROTEIN E-PHORESIS SERUM: CPT | Performed by: PATHOLOGY

## 2023-07-02 DIAGNOSIS — E78.49 OTHER HYPERLIPIDEMIA: ICD-10-CM

## 2023-07-03 RX ORDER — ATORVASTATIN CALCIUM 40 MG/1
TABLET, FILM COATED ORAL
Qty: 90 TABLET | Refills: 3 | Status: SHIPPED | OUTPATIENT
Start: 2023-07-03

## 2023-07-14 ENCOUNTER — APPOINTMENT (OUTPATIENT)
Dept: LAB | Facility: CLINIC | Age: 78
End: 2023-07-14
Payer: COMMERCIAL

## 2023-07-24 ENCOUNTER — OFFICE VISIT (OUTPATIENT)
Dept: FAMILY MEDICINE CLINIC | Facility: CLINIC | Age: 78
End: 2023-07-24
Payer: COMMERCIAL

## 2023-07-24 ENCOUNTER — RA CDI HCC (OUTPATIENT)
Dept: OTHER | Facility: HOSPITAL | Age: 78
End: 2023-07-24

## 2023-07-24 VITALS
OXYGEN SATURATION: 100 % | SYSTOLIC BLOOD PRESSURE: 128 MMHG | DIASTOLIC BLOOD PRESSURE: 56 MMHG | WEIGHT: 154.6 LBS | BODY MASS INDEX: 26.4 KG/M2 | HEIGHT: 64 IN | HEART RATE: 67 BPM

## 2023-07-24 DIAGNOSIS — F33.9 DEPRESSION, RECURRENT (HCC): ICD-10-CM

## 2023-07-24 DIAGNOSIS — I63.10 CEREBROVASCULAR ACCIDENT (CVA) DUE TO EMBOLISM OF PRECEREBRAL ARTERY (HCC): ICD-10-CM

## 2023-07-24 DIAGNOSIS — I48.0 PAROXYSMAL ATRIAL FIBRILLATION (HCC): ICD-10-CM

## 2023-07-24 DIAGNOSIS — I69.359 HEMIPLEGIA AND HEMIPARESIS FOLLOWING CEREBRAL INFARCTION AFFECTING UNSPECIFIED SIDE (HCC): Primary | ICD-10-CM

## 2023-07-24 DIAGNOSIS — G25.81 RESTLESS LEGS: ICD-10-CM

## 2023-07-24 DIAGNOSIS — G47.9 DIFFICULTY SLEEPING: ICD-10-CM

## 2023-07-24 DIAGNOSIS — L30.9 DERMATITIS: ICD-10-CM

## 2023-07-24 DIAGNOSIS — M80.00XD AGE-RELATED OSTEOPOROSIS WITH CURRENT PATHOLOGICAL FRACTURE WITH ROUTINE HEALING: ICD-10-CM

## 2023-07-24 DIAGNOSIS — M21.70 ACQUIRED LEG LENGTH DISCREPANCY: ICD-10-CM

## 2023-07-24 DIAGNOSIS — N18.31 STAGE 3A CHRONIC KIDNEY DISEASE (HCC): ICD-10-CM

## 2023-07-24 DIAGNOSIS — G25.81 RESTLESS LEGS SYNDROME: ICD-10-CM

## 2023-07-24 DIAGNOSIS — R60.0 BILATERAL LOWER EXTREMITY EDEMA: ICD-10-CM

## 2023-07-24 DIAGNOSIS — E78.49 OTHER HYPERLIPIDEMIA: ICD-10-CM

## 2023-07-24 DIAGNOSIS — Z23 NEED FOR SHINGLES VACCINE: ICD-10-CM

## 2023-07-24 DIAGNOSIS — I10 ESSENTIAL HYPERTENSION: ICD-10-CM

## 2023-07-24 PROCEDURE — 99214 OFFICE O/P EST MOD 30 MIN: CPT | Performed by: INTERNAL MEDICINE

## 2023-07-24 PROCEDURE — G0439 PPPS, SUBSEQ VISIT: HCPCS | Performed by: INTERNAL MEDICINE

## 2023-07-24 RX ORDER — BETAMETHASONE DIPROPIONATE 0.5 MG/G
CREAM TOPICAL 2 TIMES DAILY
Qty: 45 G | Refills: 0 | Status: SHIPPED | OUTPATIENT
Start: 2023-07-24

## 2023-07-24 RX ORDER — ZOSTER VACCINE RECOMBINANT, ADJUVANTED 50 MCG/0.5
0.5 KIT INTRAMUSCULAR ONCE
Qty: 1 EACH | Refills: 1 | Status: SHIPPED | OUTPATIENT
Start: 2023-07-24 | End: 2023-07-24

## 2023-07-24 RX ORDER — ROPINIROLE 0.5 MG/1
1 TABLET, FILM COATED ORAL
Qty: 60 TABLET | Refills: 5 | Status: SHIPPED | OUTPATIENT
Start: 2023-07-24

## 2023-07-24 RX ORDER — ATORVASTATIN CALCIUM 40 MG/1
20 TABLET, FILM COATED ORAL DAILY
Qty: 90 TABLET | Refills: 3 | Status: SHIPPED | OUTPATIENT
Start: 2023-07-24

## 2023-07-24 NOTE — PROGRESS NOTES
[address] [facility] coding opportunities     I69.359     Chart Reviewed number of suggestions sent to Provider: 1     GR    Patients Insurance     Medicare Insurance: [facility]

## 2023-07-24 NOTE — ASSESSMENT & PLAN NOTE
The single dose of ropinirole does not last the whole night through. I asked her to increase it to 2 tablets at bedtime as tolerated.

## 2023-07-24 NOTE — ASSESSMENT & PLAN NOTE
Well-controlled. I encouraged her to take the furosemide in the morning so she doesn't have to get up at night to urinate.

## 2023-07-24 NOTE — PATIENT INSTRUCTIONS
Make sure you take furosemide in the morning cetyl have to get up at night to urinate.   You can increase the dose of the ropinirole to 2 tablets at bedtime

## 2023-07-24 NOTE — PROGRESS NOTES
Assessment and Plan:     Problem List Items Addressed This Visit        Cardiovascular and Mediastinum    Essential hypertension     Good blood pressure control on current regimen. Paroxysmal atrial fibrillation (HCC)     Rate is controlled on metoprolol. Continue Eliquis for stroke prophylaxis. Cerebrovascular accident (CVA) due to embolism of precerebral artery (HCC)     No recurrence. It turns out she has been taking 1/2 tablet of the atorvastatin 40 mg tablets. Her last LDL was 66 so she is getting good results from that. She may continue to take half tablet. I asked her to remind me to switch her to the 20 mg tablet when she is due for refill. Nervous and Auditory    Hemiplegia and hemiparesis following cerebral infarction affecting unspecified side (HCC) - Primary     No recurrence of stroke. Continue risk factor modification. Relevant Medications    rOPINIRole (REQUIP) 0.5 mg tablet       Musculoskeletal and Integument    Dermatitis     We'll renew her betamethasone cream.         Relevant Medications    betamethasone dipropionate (DIPROSONE) 0.05 % cream    Age-related osteoporosis with current pathological fracture with routine healing     Her rheumatologist is starting her on romosozumab. Genitourinary    Stage 3a chronic kidney disease [facility])     Lab Results   Component Value Date    EGFR 53 06/28/2023    EGFR 47 06/07/2023    EGFR 53 07/11/2022    CREATININE 1.01 06/28/2023    CREATININE 1.11 06/07/2023    CREATININE 1.01 07/11/2022   Renal function is stable. Continue to monitor. Other    Other hyperlipidemia     Excellent lipid panel recently on one half of a 40 mg atorvastatin tablet. Relevant Medications    atorvastatin (LIPITOR) 40 mg tablet    Bilateral lower extremity edema     Well-controlled. I encouraged her to take the furosemide in the morning so she doesn't have to get up at night to urinate.          Restless legs The single dose of ropinirole does not last the whole night through. I asked her to increase it to 2 tablets at bedtime as tolerated. Depression, recurrent ([address])     In remission. We'll continue to follow. Acquired leg length discrepancy     Will refer to podiatry to see if a shoe lift would be appropriate. Relevant Orders    Ambulatory referral to Podiatry   Other Visit Diagnoses     Restless legs syndrome        Relevant Medications    rOPINIRole (REQUIP) 0.5 mg tablet    Difficulty sleeping        Relevant Medications    rOPINIRole (REQUIP) 0.5 mg tablet    Need for shingles vaccine        Relevant Medications    Zoster Vac Recomb Adjuvanted (Shingrix) 50 MCG/0.5ML SUSR        BMI Counseling: Body mass index is 26.54 kg/m². The BMI is above normal. Nutrition recommendations include encouraging healthy choices of fruits and vegetables. Rationale for BMI follow-up plan is due to patient being overweight or obese. Falls Plan of Care: referral to physical therapy. Urinary Incontinence Plan of Care: counseling topics discussed: taking fluid pills at a time when you can get to bathroom easily. Preventive health issues were discussed with patient, and age appropriate screening tests were ordered as noted in patient's After Visit Summary. Personalized health advice and appropriate referrals for health education or preventive services given if needed, as noted in patient's After Visit Summary. History of Present Illness:     Patient presents for a Medicare Wellness Visit. Her restless legs aren't well-controlled. She is only taking one tablet at bedtime. She doesn't take on during the day. She tends to wake up in the middle of the night because of restless legs. No new stroke symptoms. She feels like her right leg is shorter because she is limping.   She requested a refill of the betamethasone cream we gave her in the past because she has an itchy rash on her back that she keeps scratching. Problem List:     Patient Active Problem List   Diagnosis   • Essential hypertension   • Paroxysmal atrial fibrillation (HCC)   • Thyroid nodule   • Other hyperlipidemia   • Cerebrovascular accident (CVA) due to embolism of precerebral artery (HCC)   • Chondrocalcinosis of hand   • Idiopathic osteoporosis   • Intracranial aneurysm   • Multiple joint pain   • Vitamin D deficiency   • Bilateral lower extremity edema   • Restless legs   • Dermatitis   • Continuous opioid dependence (HCC)   • Stage 3a chronic kidney disease (HCC)   • Chronic pain of left knee   • Inflammation of sacroiliac joint (HCC)   • Gastroesophageal reflux disease without esophagitis   • Blurry vision   • Age-related osteoporosis with current pathological fracture with routine healing   • Hemiplegia and hemiparesis following cerebral infarction affecting unspecified side ([address])   • Depression, recurrent ([address])   • Acquired leg length discrepancy      Past Medical and Surgical History:     Past Medical History:   Diagnosis Date   • Aneurysm ([address])     Followed by Dr. Neil Mosley in [place]. CTA  April 2018 Stable left C6 aneurysm   • Arthritis    • Atrial fibrillation (HCC)    • Insomnia     Has been taking trazodone for sleep for at least a decade. No side effects. She can't sleep without the trazodone. • Multinodular goiter 2016    Throid US confirmed multinodular goiter.    • Stroke [facility])      Past Surgical History:   Procedure Laterality Date   • APPENDECTOMY     • BUNIONECTOMY     • CATARACT EXTRACTION W/ INTRAOCULAR LENS  IMPLANT, BILATERAL     • TOTAL HIP ARTHROPLASTY Right 03/17/2023    Broken femur with right hip replacement      Family History:     Family History   Problem Relation Age of Onset   • Cancer Mother    • Stroke Mother    • Uterine cancer Mother    • No Known Problems Father    • No Known Problems Maternal Grandmother    • No Known Problems Maternal Grandfather    • No Known Problems Paternal Grandmother    • No Known Problems Paternal Grandfather    • No Known Problems Maternal Aunt    • No Known Problems Son    • BRCA2 Positive Neg Hx    • BRCA2 Negative Neg Hx    • BRCA1 Negative Neg Hx    • BRCA1 Positive Neg Hx    • BRCA 1/2 Neg Hx    • Ovarian cancer Neg Hx    • Endometrial cancer Neg Hx    • Colon cancer Neg Hx    • Breast cancer additional onset Neg Hx    • Breast cancer Neg Hx       Social History:     Social History     Socioeconomic History   • Marital status:      Spouse name: None   • Number of children: None   • Years of education: None   • Highest education level: None   Occupational History   • None   Tobacco Use   • Smoking status: Never   • Smokeless tobacco: Never   Vaping Use   • Vaping Use: Never used   Substance and Sexual Activity   • Alcohol use: No   • Drug use: No   • Sexual activity: Not Currently   Other Topics Concern   • None   Social History Narrative    She lives alone in [place]. Her son is nearby. Social Determinants of Health     Financial Resource Strain: Low Risk  (7/24/2023)    Overall Financial Resource Strain (CARDIA)    • Difficulty of Paying Living Expenses: Not very hard   Food Insecurity: Not on file   Transportation Needs: No Transportation Needs (7/24/2023)    PRAPARE - Transportation    • Lack of Transportation (Medical): No    • Lack of Transportation (Non-Medical):  No   Physical Activity: Not on file   Stress: Not on file   Social Connections: Not on file   Intimate Partner Violence: Not on file   Housing Stability: Not on file      Medications and Allergies:     Current Outpatient Medications   Medication Sig Dispense Refill   • amLODIPine (NORVASC) 5 mg tablet Take 5 mg by mouth daily       • atorvastatin (LIPITOR) 40 mg tablet Take 0.5 tablets (20 mg total) by mouth daily 90 tablet 3   • betamethasone dipropionate (DIPROSONE) 0.05 % cream Apply topically 2 (two) times a day 45 g 0   • Calcium Carbonate 1500 (600 Ca) MG TABS 1,200 mg     • colchicine (COLCRYS) 0.6 mg tablet Take 0.6 mg by mouth daily  0   • Eliquis 5 MG take 1 tablet by mouth twice a day 60 tablet 5   • furosemide (LASIX) 20 mg tablet Take 1 tablet (20 mg total) by mouth daily 30 tablet 5   • metoprolol tartrate (LOPRESSOR) 50 mg tablet Take 50 mg by mouth every 12 (twelve) hours    0   • Multiple Vitamins-Minerals (MULTIVITAMIN ADULT PO) Take 1 tablet by mouth daily     • rOPINIRole (REQUIP) 0.5 mg tablet Take 2 tablets (1 mg total) by mouth daily at bedtime 60 tablet 5   • traMADol (ULTRAM) 50 mg tablet Take 50 mg by mouth 3 (three) times a day    0   • traZODone (DESYREL) 50 mg tablet take 1 tablet by mouth at bedtime 30 tablet 5   • valsartan (DIOVAN) 160 mg tablet Take 160 mg by mouth daily     • Zoster Vac Recomb Adjuvanted (Shingrix) 50 MCG/0.5ML SUSR Inject 0.5 mL into a muscle once for 1 dose Repeat dose in 2 to 6 months 1 each 1   • senna (SENOKOT) 8.6 MG tablet Take 2 tablets by mouth       No current facility-administered medications for this visit.      No Known Allergies   Immunizations:     Immunization History   Administered Date(s) Administered   • COVID-19 MODERNA VACC 0.5 ML IM 01/25/2021, 02/22/2021, 12/21/2021   • INFLUENZA 10/30/2015, 09/21/2016, 10/05/2017, 10/08/2018, 10/22/2019   • Influenza Split High Dose Preservative Free IM 10/30/2015, 09/21/2016, 10/08/2018, 10/22/2019   • Influenza, high dose seasonal 0.7 mL 09/14/2020, 10/15/2021, 01/24/2023   • Pneumococcal Conjugate 13-Valent 07/02/2015, 06/15/2016   • Zoster 07/26/2013      Health Maintenance:         Topic Date Due   • Breast Cancer Screening: Mammogram  09/10/2022   • DXA SCAN  11/23/2025   • Hepatitis C Screening  Completed         Topic Date Due   • Pneumococcal Vaccine: 65+ Years (2 - PPSV23 if available, else PCV20) 06/15/2017   • COVID-19 Vaccine (4 - Moderna series) 02/15/2022   • Influenza Vaccine (1) 09/01/2023      Medicare Screening Tests and Risk Assessments:         Health Risk Assessment:   Patient rates overall health as good. Patient feels that their physical health rating is slightly worse. Patient is satisfied with their life. Eyesight was rated as slightly worse. Hearing was rated as slightly worse. Patient feels that their emotional and mental health rating is same. Patients states they are never, rarely angry. Patient states they are often unusually tired/fatigued. Pain experienced in the last 7 days has been some. Patient's pain rating has been 3/10. Patient states that she has experienced no weight loss or gain in last 6 months. Has pain in the right hip if she bends too far. Depression Screening:   PHQ-2 Score: 0      Fall Risk Screening: In the past year, patient has experienced: history of falling in past year    Number of falls: 1  Injured during fall?: Yes    Feels unsteady when standing or walking?: Yes    Worried about falling?: Yes      Urinary Incontinence Screening:   Patient has leaked urine accidently in the last six months. She takes a diuretic at night and she has trouble getting to the bathroom in time. Home Safety:  Patient does not have trouble with stairs inside or outside of their home. Patient has working smoke alarms and has no working carbon monoxide detector. Home safety hazards include: none. Nutrition:   Current diet is Regular. Medications:   Patient is not currently taking any over-the-counter supplements. Patient is able to manage medications. Activities of Daily Living (ADLs)/Instrumental Activities of Daily Living (IADLs):   Walk and transfer into and out of bed and chair?: Yes  Dress and groom yourself?: Yes    Bathe or shower yourself?: Yes    Feed yourself?  Yes  Do your laundry/housekeeping?: Yes  Manage your money, pay your bills and track your expenses?: Yes  Make your own meals?: Yes    Do your own shopping?: Yes    Previous Hospitalizations:   Any hospitalizations or ED visits within the last 12 months?: Yes    How many hospitalizations have you had in the last year?: 1-2    Hospitalization Comments: Was hospitalized for hip fracture. Advance Care Planning:   Living will: Yes    Advanced directive: Yes    Advanced directive counseling given: Yes    End of Life Decisions reviewed with patient: Yes      Comments: Indigo Forbes chooses comfort care measures in the event of a terminal diagnosis. PREVENTIVE SCREENINGS      Cardiovascular Screening:    General: Screening Not Indicated and History Lipid Disorder      Diabetes Screening:     General: Screening Current      Breast Cancer Screening:     General: Screening Current      Cervical Cancer Screening:    General: Screening Not Indicated      Osteoporosis Screening:    General: Screening Not Indicated and History Osteoporosis      Abdominal Aortic Aneurysm (AAA) Screening:        General: Screening Not Indicated      Lung Cancer Screening:     General: Screening Not Indicated      Hepatitis C Screening:    General: Screening Current    Screening, Brief Intervention, and Referral to Treatment (SBIRT)    Screening  Typical number of drinks in a day: 0  Typical number of drinks in a week: 0  Interpretation: Low risk drinking behavior. AUDIT-C Screenin) How often did you have a drink containing alcohol in the past year? never  2) How many drinks did you have on a typical day when you were drinking in the past year? 0  3) How often did you have 6 or more drinks on one occasion in the past year? never    AUDIT-C Score: 0  Interpretation: Score 0-2 (female): Negative screen for alcohol misuse    Review of Current Opioid Use    Opioid Risk Tool (ORT) Interpretation: Complete Opioid Risk Tool (ORT)    No results found. Physical Exam:     /56 (BP Location: Left arm, Patient Position: Sitting, Cuff Size: Standard)   Pulse 67   Ht 5' 4" (1.626 m)   Wt 70.1 kg (154 lb 9.6 oz)   SpO2 100%   BMI 26.54 kg/m²     Physical Exam  Constitutional:       General: She is not in acute distress.      Appearance: Normal appearance. She is well-developed. She is not ill-appearing. Neck:      Vascular: No JVD. Cardiovascular:      Rate and Rhythm: Normal rate and regular rhythm. Heart sounds: Normal heart sounds. No murmur heard. No friction rub. No gallop. Pulmonary:      Breath sounds: Normal breath sounds. Abdominal:      General: Bowel sounds are normal. There is no distension. Palpations: Abdomen is soft. There is no mass. Musculoskeletal:         General: No swelling. Comments: Her right leg does seem to be shorter than the left (see image attached to this encounter. Skin:     Comments: Several excoriations on the right lower back. The skin is xerotic. Neurological:      Mental Status: She is alert.           Christopher Larry MD

## 2023-07-24 NOTE — ASSESSMENT & PLAN NOTE
No recurrence. It turns out she has been taking 1/2 tablet of the atorvastatin 40 mg tablets. Her last LDL was 66 so she is getting good results from that. She may continue to take half tablet. I asked her to remind me to switch her to the 20 mg tablet when she is due for refill.

## 2023-07-24 NOTE — ASSESSMENT & PLAN NOTE
Lab Results   Component Value Date    EGFR 53 06/28/2023    EGFR 47 06/07/2023    EGFR 53 07/11/2022    CREATININE 1.01 06/28/2023    CREATININE 1.11 06/07/2023    CREATININE 1.01 07/11/2022   Renal function is stable. Continue to monitor.

## 2023-07-26 NOTE — ASSESSMENT & PLAN NOTE
All excellent cholesterol on atorvastatin 1/2 of a 40 mg tablet daily  Recheck lipid panel before next visit 
Excellent blood pressure on current regimen 
Follow up CTA of brain scheduled for April 
Most recent bone density was obtained in November  Is on alendronate 70 mg weekly  Continue to follow with Rheumatology (Dr Carley Sosa) in [place] 
No evidence of any new stroke  Continue Eliquis 
Rate is controlled on metoprolol  Continue Eliquis for stroke prevention 
0 = understands/communicates without difficulty

## 2023-08-04 ENCOUNTER — OFFICE VISIT (OUTPATIENT)
Dept: PODIATRY | Age: 78
End: 2023-08-04
Payer: COMMERCIAL

## 2023-08-04 VITALS
WEIGHT: 149 LBS | HEIGHT: 64 IN | OXYGEN SATURATION: 97 % | BODY MASS INDEX: 25.44 KG/M2 | SYSTOLIC BLOOD PRESSURE: 142 MMHG | HEART RATE: 71 BPM | DIASTOLIC BLOOD PRESSURE: 76 MMHG | TEMPERATURE: 98.2 F

## 2023-08-04 DIAGNOSIS — M21.70 ACQUIRED LEG LENGTH DISCREPANCY: ICD-10-CM

## 2023-08-04 PROCEDURE — 99203 OFFICE O/P NEW LOW 30 MIN: CPT | Performed by: STUDENT IN AN ORGANIZED HEALTH CARE EDUCATION/TRAINING PROGRAM

## 2023-08-04 NOTE — PROGRESS NOTES
Assessment/Plan:     Diagnoses and all orders for this visit:    Acquired leg length discrepancy  -     Ambulatory referral to Podiatry              IMPRESSION:  · Slight LLD L>R s/p right hip hemiarthroplasty for femoral neck fracture performed on 03/17/23     PLAN:  · I reviewed clinical exam with patient in detail today. I have discussed with the patient the pathophysiology of this diagnosis and reviewed how the examination correlates with this diagnosis. · I reviewed PCP note from 7/24/23  · Patient does have mild LLD with L>R by about 2cm. Will attempt to correct with OTC graduated heel lift to be place in shoe. I gave handout for where to find these on [address] [place] [address]. I discussed gradually ease in to wearing this   · If patient would like, rx for heel lift or shoe lift could be given however this could be quite pricey. I discussed she would need a CT scannogram to exactly measure prior to this rx. · F/u PRN      Subjective:      Patient ID: Hardin Sicard is a 76 y.o. female. Daxa presents to clinic today concerning eval for possible LLD. s/p right hip hemiarthroplasty for femoral neck fracture performed on 03/17/23. Recently saw PCP who referred her here. The following portions of the patient's history were reviewed and updated as appropriate: allergies, current medications, past family history, past medical history, past social history, past surgical history and problem list.    Review of Systems   Constitutional: Negative for activity change, chills and fever. HENT: Negative. Respiratory: Negative for cough, chest tightness and shortness of breath. Cardiovascular: Negative for chest pain and leg swelling. Endocrine: Negative. Genitourinary: Negative. Musculoskeletal: Positive for gait problem (limp). Neurological: Negative for numbness. Psychiatric/Behavioral: Negative. Negative for agitation and behavioral problems.          Objective:      /76 (BP Location: Left arm, Patient Position: Sitting, Cuff Size: Standard)   Pulse 71   Temp 98.2 °F (36.8 °C)   Ht 5' 4" (1.626 m)   Wt 67.6 kg (149 lb)   SpO2 97%   BMI 25.58 kg/m²          Physical Exam  Constitutional:       General: She is not in acute distress. Appearance: Normal appearance. She is not ill-appearing. Cardiovascular:      Comments: Bilateral DP/PT pulses are palpable. Legs to toes warm to cool. Varicosities with mild LE edema noted. Pulmonary:      Effort: No respiratory distress. Musculoskeletal:         General: Deformity (Slight approx 2cm LLD with L>R. ) present. No tenderness. Normal range of motion. Comments: ASIS to medial malleolus RLE 90cm. ASIS to medial malleolus LLE 92cm. Approximately. Skin:     Capillary Refill: Capillary refill takes less than 2 seconds. Comments: B/L LE skin is atrophic - thin, dry and shiny in appearance. No open wounds noted. Neurological:      General: No focal deficit present. Mental Status: She is alert and oriented to person, place, and time. Comments: Gross sensation to feet intact. Patient denies numbness, tingling and burning to B/L feet.     Psychiatric:         Mood and Affect: Mood normal.         Behavior: Behavior normal.

## 2023-08-04 NOTE — PATIENT INSTRUCTIONS
2cm heel lift for right foot. [email or web address] - search for adjustable orthopedic heel lift for leg length discrepancies. Trial full vs half length.

## 2023-09-06 ENCOUNTER — OFFICE VISIT (OUTPATIENT)
Dept: FAMILY MEDICINE CLINIC | Facility: CLINIC | Age: 78
End: 2023-09-06
Payer: COMMERCIAL

## 2023-09-06 VITALS
DIASTOLIC BLOOD PRESSURE: 68 MMHG | HEART RATE: 66 BPM | OXYGEN SATURATION: 96 % | WEIGHT: 147.6 LBS | SYSTOLIC BLOOD PRESSURE: 158 MMHG | HEIGHT: 64 IN | BODY MASS INDEX: 25.2 KG/M2

## 2023-09-06 DIAGNOSIS — E78.2 MIXED HYPERLIPIDEMIA: ICD-10-CM

## 2023-09-06 DIAGNOSIS — I10 ESSENTIAL HYPERTENSION: ICD-10-CM

## 2023-09-06 DIAGNOSIS — I48.0 PAROXYSMAL ATRIAL FIBRILLATION (HCC): Primary | ICD-10-CM

## 2023-09-06 PROCEDURE — 99214 OFFICE O/P EST MOD 30 MIN: CPT | Performed by: FAMILY MEDICINE

## 2023-09-06 PROCEDURE — 93000 ELECTROCARDIOGRAM COMPLETE: CPT | Performed by: FAMILY MEDICINE

## 2023-09-06 NOTE — PROGRESS NOTES
Assessment/Plan:       1. Paroxysmal atrial fibrillation (HCC)  Assessment & Plan:  Continue meds, no changes    Orders:  -     POCT ECG    2. Essential hypertension  Assessment & Plan:  Continue med,s no changes      3. Mixed hyperlipidemia  Assessment & Plan:  Continue meds, no changes          Subjective:      Patient ID: Guillermina Mora is a 76 y.o. female. And is here to follow-up on hypertension atrial fibrillation and mixed hyperlipidemia. She developed A-fib 6 or 7 years ago had a CVA at that time she was cardioverted and most recently cardioverted in 2013 she is on Eliquis and metoprolol. Blood pressure stable and controlled cholesterol stable and controlled. She was at a rheumatology appointment last week was being considered for a Prolia injection and developed about a 10 to 15-minute dizzy episode. The rheumatologist advised her to go to the ER or to her doctor's office. She did not go to the ER but came here today several days later instead. She was not concerned because she is tells me that she has been getting these episodes the last 10 to 15 minutes of dizziness for several months. Her EKG today shows normal sinus rhythm with a first-degree AV block without atrial fibrillation or acute ischemia. I did call her cardiologist Dr. Ene Rosales at [address] [place] [address] spoke with one of his colleagues let them know what is going on obviously if nothing urgent or life-threatening but they will reach out to her with the appointment for follow-up and a Holter monitor. She is cleared to get her Prolia injections. She should let us know if she has further symptoms. The following portions of the patient's history were reviewed and updated as appropriate: allergies, current medications, past family history, past medical history, past social history, past surgical history, and problem list.    Review of Systems   Respiratory: Negative. Cardiovascular: Negative. Gastrointestinal: Negative. Objective:      /68 (BP Location: Left arm, Patient Position: Sitting, Cuff Size: Standard)   Pulse 66   Ht 5' 4" (1.626 m)   Wt 67 kg (147 lb 9.6 oz)   SpO2 96%   BMI 25.34 kg/m²          Physical Exam  Vitals and nursing note reviewed. Constitutional:       Appearance: Normal appearance. HENT:      Head: Normocephalic and atraumatic. Nose: Nose normal.      Mouth/Throat:      Mouth: Mucous membranes are moist.   Eyes:      Extraocular Movements: Extraocular movements intact. Pupils: Pupils are equal, round, and reactive to light. Cardiovascular:      Rate and Rhythm: Normal rate and regular rhythm. Pulses: Normal pulses. Pulmonary:      Effort: Pulmonary effort is normal.      Breath sounds: Normal breath sounds. Abdominal:      General: Bowel sounds are normal.      Palpations: Abdomen is soft. Musculoskeletal:      Cervical back: Normal range of motion. Skin:     General: Skin is warm and dry. Capillary Refill: Capillary refill takes less than 2 seconds. Neurological:      General: No focal deficit present. Mental Status: She is alert.    Psychiatric:         Mood and Affect: Mood normal.

## 2023-09-12 ENCOUNTER — HOSPITAL ENCOUNTER (OUTPATIENT)
Dept: RADIOLOGY | Facility: CLINIC | Age: 78
Discharge: HOME | End: 2023-09-12
Payer: COMMERCIAL

## 2023-09-12 VITALS — WEIGHT: 147 LBS | BODY MASS INDEX: 25.1 KG/M2 | HEIGHT: 64 IN

## 2023-09-12 DIAGNOSIS — Z12.31 ENCOUNTER FOR SCREENING MAMMOGRAM FOR BREAST CANCER: ICD-10-CM

## 2023-09-12 PROCEDURE — 77063 BREAST TOMOSYNTHESIS BI: CPT

## 2023-09-12 PROCEDURE — 77067 SCR MAMMO BI INCL CAD: CPT

## 2023-10-07 DIAGNOSIS — F51.04 CHRONIC INSOMNIA: ICD-10-CM

## 2023-10-07 DIAGNOSIS — I10 ESSENTIAL HYPERTENSION: ICD-10-CM

## 2023-10-09 RX ORDER — TRAZODONE HYDROCHLORIDE 50 MG/1
TABLET ORAL
Qty: 30 TABLET | Refills: 5 | Status: SHIPPED | OUTPATIENT
Start: 2023-10-09

## 2023-10-09 RX ORDER — FUROSEMIDE 20 MG/1
20 TABLET ORAL DAILY
Qty: 30 TABLET | Refills: 5 | Status: SHIPPED | OUTPATIENT
Start: 2023-10-09

## 2023-11-04 ENCOUNTER — RA CDI HCC (OUTPATIENT)
Dept: OTHER | Facility: HOSPITAL | Age: 78
End: 2023-11-04

## 2023-11-06 ENCOUNTER — OFFICE VISIT (OUTPATIENT)
Dept: FAMILY MEDICINE CLINIC | Facility: CLINIC | Age: 78
End: 2023-11-06
Payer: COMMERCIAL

## 2023-11-06 ENCOUNTER — APPOINTMENT (OUTPATIENT)
Dept: LAB | Facility: CLINIC | Age: 78
End: 2023-11-06
Payer: COMMERCIAL

## 2023-11-06 VITALS
HEART RATE: 59 BPM | BODY MASS INDEX: 25.37 KG/M2 | SYSTOLIC BLOOD PRESSURE: 150 MMHG | HEIGHT: 64 IN | DIASTOLIC BLOOD PRESSURE: 72 MMHG | OXYGEN SATURATION: 99 % | WEIGHT: 148.6 LBS

## 2023-11-06 DIAGNOSIS — Z23 ENCOUNTER FOR IMMUNIZATION: Primary | ICD-10-CM

## 2023-11-06 DIAGNOSIS — L57.0 ACTINIC KERATOSIS: ICD-10-CM

## 2023-11-06 DIAGNOSIS — I48.0 PAROXYSMAL ATRIAL FIBRILLATION (HCC): ICD-10-CM

## 2023-11-06 DIAGNOSIS — N18.31 STAGE 3A CHRONIC KIDNEY DISEASE (HCC): ICD-10-CM

## 2023-11-06 DIAGNOSIS — G25.81 RESTLESS LEGS SYNDROME: ICD-10-CM

## 2023-11-06 DIAGNOSIS — G47.9 DIFFICULTY SLEEPING: ICD-10-CM

## 2023-11-06 DIAGNOSIS — E78.5 HYPERLIPIDEMIA, UNSPECIFIED HYPERLIPIDEMIA TYPE: ICD-10-CM

## 2023-11-06 DIAGNOSIS — E78.49 OTHER HYPERLIPIDEMIA: ICD-10-CM

## 2023-11-06 LAB
ANION GAP SERPL CALCULATED.3IONS-SCNC: 8 MMOL/L
AST SERPL W P-5'-P-CCNC: 21 U/L (ref 13–39)
BUN SERPL-MCNC: 14 MG/DL (ref 5–25)
CALCIUM SERPL-MCNC: 9.5 MG/DL (ref 8.4–10.2)
CHLORIDE SERPL-SCNC: 104 MMOL/L (ref 96–108)
CHOLEST SERPL-MCNC: 120 MG/DL
CK SERPL-CCNC: 83 U/L (ref 26–192)
CO2 SERPL-SCNC: 29 MMOL/L (ref 21–32)
CREAT SERPL-MCNC: 0.78 MG/DL (ref 0.6–1.3)
GFR SERPL CREATININE-BSD FRML MDRD: 72 ML/MIN/1.73SQ M
GLUCOSE P FAST SERPL-MCNC: 91 MG/DL (ref 65–99)
HDLC SERPL-MCNC: 61 MG/DL
LDLC SERPL CALC-MCNC: 43 MG/DL (ref 0–100)
MAGNESIUM SERPL-MCNC: 2.4 MG/DL (ref 1.9–2.7)
NONHDLC SERPL-MCNC: 59 MG/DL
POTASSIUM SERPL-SCNC: 4 MMOL/L (ref 3.5–5.3)
SODIUM SERPL-SCNC: 141 MMOL/L (ref 135–147)
TRIGL SERPL-MCNC: 79 MG/DL
TSH SERPL DL<=0.05 MIU/L-ACNC: 1.08 UIU/ML (ref 0.45–4.5)

## 2023-11-06 PROCEDURE — 84450 TRANSFERASE (AST) (SGOT): CPT

## 2023-11-06 PROCEDURE — 80048 BASIC METABOLIC PNL TOTAL CA: CPT

## 2023-11-06 PROCEDURE — 17000 DESTRUCT PREMALG LESION: CPT | Performed by: INTERNAL MEDICINE

## 2023-11-06 PROCEDURE — 99214 OFFICE O/P EST MOD 30 MIN: CPT | Performed by: INTERNAL MEDICINE

## 2023-11-06 PROCEDURE — 82550 ASSAY OF CK (CPK): CPT

## 2023-11-06 PROCEDURE — 84443 ASSAY THYROID STIM HORMONE: CPT

## 2023-11-06 PROCEDURE — G0008 ADMIN INFLUENZA VIRUS VAC: HCPCS

## 2023-11-06 PROCEDURE — 80061 LIPID PANEL: CPT

## 2023-11-06 PROCEDURE — 36415 COLL VENOUS BLD VENIPUNCTURE: CPT

## 2023-11-06 PROCEDURE — 83735 ASSAY OF MAGNESIUM: CPT

## 2023-11-06 PROCEDURE — 90662 IIV NO PRSV INCREASED AG IM: CPT

## 2023-11-06 RX ORDER — DENOSUMAB 60 MG/ML
60 INJECTION SUBCUTANEOUS ONCE
COMMUNITY
Start: 2023-08-14

## 2023-11-06 RX ORDER — ROPINIROLE 0.5 MG/1
0.5 TABLET, FILM COATED ORAL 2 TIMES DAILY
Qty: 60 TABLET | Refills: 5 | Status: SHIPPED | OUTPATIENT
Start: 2023-11-06 | End: 2023-11-06 | Stop reason: SDUPTHER

## 2023-11-06 RX ORDER — ROPINIROLE 0.5 MG/1
0.5 TABLET, FILM COATED ORAL 3 TIMES DAILY
Qty: 180 TABLET | Refills: 3 | Status: SHIPPED | OUTPATIENT
Start: 2023-11-06

## 2023-11-06 NOTE — PROGRESS NOTES
Assessment/Plan:    Problem List Items Addressed This Visit    None  Visit Diagnoses     Encounter for immunization    -  Primary    Relevant Orders    influenza vaccine, high-dose, PF 0.7 mL (FLUZONE HIGH-DOSE) (Completed)    Restless legs syndrome        Relevant Medications    rOPINIRole (REQUIP) 0.5 mg tablet    Difficulty sleeping        Relevant Medications    rOPINIRole (REQUIP) 0.5 mg tablet    Actinic keratosis        Relevant Orders    Lesion Destruction (Completed)        Restless legs syndrome. Will refill Requip and advised to increase frequency to 3 times a day. Difficulty sleeping. Advised to discontinue trazodone if she sees improvement from increased frequency of Requip. Actinic keratosis. Treated the lesions with Rapid Freeze for a total of 20 seconds on each lesion and advised to expect to dry up and fall off. Advised to reach out if she noticed an appearance of infection. Immunization. Will administer influenza vaccine today, 11/06/2023. Encouraged in receiving COVID-19 vaccine and RSV vaccine and advised for a 2-week interval.    She will follow up in 01/2024 and has been advised that she can follow up in 6 weeks if current management shows no improvement. Chief Complaint    Insomnia; Care Gap Request         Patient ID: Juan Manuel Guevara is a 78 y.o. female who returns for evaluation of sleeping problem and restless legs syndrome. The patient reports sleeping problems and restless legs syndrome since her total hip arthroplasty on 03/17/2023. She was prescribed Reqiup 0.5 mg 1 tablet once a day for restless legs syndrome while in the hospital.  This really didn't help her restless leg problem and when she was in the rehab, they did increase it to 0.5 mg twice a day. She finds that she can sleep until about 1 in the morning and then she tries taking trazodone 50 mg which really doesn't help her sleep.   She finds that she has to get up several times at night just to walk around because of her restless legs. She reports being awake until 5:00 in the morning due to restless legs in which she had to walk or move around. She wants a 90-day supply of Requip and inquires about the maximum dose she can take. She inquires if I still have cryotherapy for the lesion on her nose and wishes if I could examine it today, 11/06/2023. Immunization. She denies receiving COVID-19 vaccine and is considering in receiving influenza vaccine today, 11/06/2023. Objective:    /72 (BP Location: Left arm, Patient Position: Sitting, Cuff Size: Standard)   Pulse 59   Ht 5' 4" (1.626 m)   Wt 67.4 kg (148 lb 9.6 oz)   SpO2 99%   BMI 25.51 kg/m²     Wt Readings from Last 3 Encounters:   11/06/23 67.4 kg (148 lb 9.6 oz)   09/12/23 66.7 kg (147 lb)   09/06/23 67 kg (147 lb 9.6 oz)         Physical Exam   General: Well-developed, well-nourished, in no acute distress. Skin: Right side of the nose, there is a 2 mm white scaly papule and about 1 mm on the left side of the nose. Lesion Destruction    Date/Time: 11/6/2023 9:15 AM    Performed by: Heather Joshi MD  Authorized by: Heather Joshi MD  Universal Protocol:  Consent: Verbal consent obtained. Consent given by: patient    Procedure Details - Lesion Destruction:     Number of Lesions:  2  Lesion 1:     Body area:  Head/neck    Head/neck location:  Nose    Initial size (mm):  2    Final defect size (mm):  2    Malignancy: pre-malignant lesion      Destruction method: cryotherapy    Lesion 2:     Body area:  Head/neck    Head/neck location:  Nose    Initial size (mm):  1    Final defect size (mm):  1      Transcribed for Heather Joshi MD, by Shayla Borden on 11/06/23 at 6:12 PM. Powered by [employer].

## 2024-01-29 ENCOUNTER — OFFICE VISIT (OUTPATIENT)
Dept: FAMILY MEDICINE CLINIC | Facility: CLINIC | Age: 79
End: 2024-01-29
Payer: COMMERCIAL

## 2024-01-29 ENCOUNTER — APPOINTMENT (OUTPATIENT)
Dept: RADIOLOGY | Facility: CLINIC | Age: 79
End: 2024-01-29
Payer: COMMERCIAL

## 2024-01-29 VITALS
OXYGEN SATURATION: 99 % | HEART RATE: 120 BPM | HEIGHT: 64 IN | DIASTOLIC BLOOD PRESSURE: 62 MMHG | BODY MASS INDEX: 25.92 KG/M2 | WEIGHT: 151.8 LBS | SYSTOLIC BLOOD PRESSURE: 126 MMHG

## 2024-01-29 DIAGNOSIS — N18.31 STAGE 3A CHRONIC KIDNEY DISEASE (HCC): ICD-10-CM

## 2024-01-29 DIAGNOSIS — I48.0 PAROXYSMAL ATRIAL FIBRILLATION (HCC): Primary | ICD-10-CM

## 2024-01-29 DIAGNOSIS — M25.551 RIGHT HIP PAIN: ICD-10-CM

## 2024-01-29 DIAGNOSIS — M46.1 INFLAMMATION OF SACROILIAC JOINT (HCC): ICD-10-CM

## 2024-01-29 DIAGNOSIS — F11.20 CONTINUOUS OPIOID DEPENDENCE (HCC): ICD-10-CM

## 2024-01-29 DIAGNOSIS — I10 ESSENTIAL HYPERTENSION: ICD-10-CM

## 2024-01-29 DIAGNOSIS — M80.00XD AGE-RELATED OSTEOPOROSIS WITH CURRENT PATHOLOGICAL FRACTURE WITH ROUTINE HEALING: ICD-10-CM

## 2024-01-29 DIAGNOSIS — I69.359 HEMIPLEGIA AND HEMIPARESIS FOLLOWING CEREBRAL INFARCTION AFFECTING UNSPECIFIED SIDE (HCC): ICD-10-CM

## 2024-01-29 PROCEDURE — 3074F SYST BP LT 130 MM HG: CPT | Performed by: INTERNAL MEDICINE

## 2024-01-29 PROCEDURE — 3078F DIAST BP <80 MM HG: CPT | Performed by: INTERNAL MEDICINE

## 2024-01-29 PROCEDURE — 1160F RVW MEDS BY RX/DR IN RCRD: CPT | Performed by: INTERNAL MEDICINE

## 2024-01-29 PROCEDURE — 73502 X-RAY EXAM HIP UNI 2-3 VIEWS: CPT

## 2024-01-29 PROCEDURE — 1159F MED LIST DOCD IN RCRD: CPT | Performed by: INTERNAL MEDICINE

## 2024-01-29 PROCEDURE — 99214 OFFICE O/P EST MOD 30 MIN: CPT | Performed by: INTERNAL MEDICINE

## 2024-01-29 NOTE — PROGRESS NOTES
Name: Dian Tracey      : 1945      MRN: [id number]  Encounter Provider: Fransisco Duncan MD  Encounter Date: 2024   Encounter department: [facility] PRIMARY CARE    Assessment & Plan     Assessment & Plan  1. Right hip pain.  I think she probably has some pretty significant arthritis in her right hip. I will get an x-ray of her right hip. Depending on the x-ray results, we may get her back to see the orthopedic surgeon. For now, she is to continue with the tramadol. She can try taking 2 at one time and see if that makes it better.    2. Back pain.  She is to follow up with the rheumatologist. Continue Prolia.    3. Osteoporosis.  She is to continue seeing the rheumatologist. She will continue Prolia.    4. Atrial fibrillation.  This seems to be well controlled. She has not had any further strokes. She will continue Eliquis to prevent any further strokes.    5. Kidney function.  Her kidney function was off a little bit. I will check a follow-up basic metabolic panel.       Visual changes were transient, only lasted a few seconds.  I don't think this was a TIA or a retinal detachment.  Certainly does not suggest a rupture of cerebral aneurysm.  I asked her to make an appointment with her eye doctor to get her retinas checked out.    Subjective      History of Present Illness  The patient is an 78-year-old female who presents for evaluation of multiple medical concerns.    She has terrible pain on the outside of her leg all the way down to the knee. It hurts when she walks. It is fine when she is sitting. It hurts on the outside of her thigh when she walks. It gets worse when she walks and makes her stop. It started in the beginning of 2024. She denies any fall or injury. She takes tramadol every 6 hours for her arthritis. She does not feel the pain except in the morning when she wakes up. Her shoulder hurts a little bit, but it goes away. She does not think the tramadol is helping her leg  pain. She puts a heating pad on at night, which feels good.    She denies any heart racing of her heart. She had atrial fibrillation on Thanksgiving. She denies any episodes of palpitations. She is still taking Eliquis 5 mg twice a day.    She follows up with her rheumatologist twice a year for her osteoporosis. She is on Prolia. She had a stroke a while back. She denies any new issues like losing her ability to speak or weakness on one side of her body or the other.    She was watching television at 11:00 at night. She got a clogged over her eye. She saw the lighting, but she did not see anything. She was dizzy. It lasted for seconds. When she opened her eyes, everything was normal. It has been a long time since she has seen an eye doctor.    Supplemental Information  She takes tramadol for her arthritis. She does not feel the pain except in the morning when she wakes up. Her shoulder hurts a little bit, but it goes away.  Review of Systems    Current Outpatient Medications on File Prior to Visit   Medication Sig    amLODIPine (NORVASC) 5 mg tablet Take 5 mg by mouth daily      atorvastatin (LIPITOR) 40 mg tablet Take 0.5 tablets (20 mg total) by mouth daily    betamethasone dipropionate (DIPROSONE) 0.05 % cream Apply topically 2 (two) times a day    Calcium Carbonate 1500 (600 Ca) MG TABS 1,200 mg    colchicine (COLCRYS) 0.6 mg tablet Take 0.6 mg by mouth daily    denosumab (Prolia) 60 mg/mL Inject 60 mg under the skin once    Eliquis 5 MG take 1 tablet by mouth twice a day    furosemide (LASIX) 20 mg tablet take 1 tablet by mouth once daily    metoprolol tartrate (LOPRESSOR) 50 mg tablet Take 50 mg by mouth every 12 (twelve) hours      Multiple Vitamins-Minerals (MULTIVITAMIN ADULT PO) Take 1 tablet by mouth daily    rOPINIRole (REQUIP) 0.5 mg tablet Take 1 tablet (0.5 mg total) by mouth 3 (three) times a day    traMADol (ULTRAM) 50 mg tablet Take 50 mg by mouth 3 (three) times a day      traZODone (DESYREL) 50  "mg tablet take 1 tablet by mouth at bedtime    valsartan (DIOVAN) 160 mg tablet Take 160 mg by mouth daily       Objective     /62 (BP Location: Right arm, Patient Position: Sitting, Cuff Size: Standard)   Pulse (!) 120   Ht 5' 4\" (1.626 m)   Wt 68.9 kg (151 lb 12.8 oz)   SpO2 99%   BMI 26.06 kg/m²     Physical Exam  She is uncomfortable with me flexing her hip to about 90 degrees. Pain with external rotation and internal rotation and abduction of the right hip is also limited.    Vital Signs  Blood pressure is 126/62.    General:  Well-developed, well-nourished, in no acute distress.  Cardiac:  Regular rate and rhythm, no murmur, gallop, or rub.  There is no JVD or HJR.  Lungs:  Clear to auscultation and percussion.  Abdomen:  Soft, nontender, normoactive bowel sounds, no palpable masses, no hepatosplenomegaly.  Extremities:  No clubbing, cyanosis, or edema.      "

## 2024-01-30 ENCOUNTER — TELEPHONE (OUTPATIENT)
Dept: FAMILY MEDICINE CLINIC | Facility: CLINIC | Age: 79
End: 2024-01-30

## 2024-01-30 NOTE — TELEPHONE ENCOUNTER
----- Message from Fransisco Duncan MD sent at 1/30/2024  8:06 AM EST -----  Please call the patient regarding lab results: Her hip x-ray shows that the right hip replacement is in satisfactory position and does not display any abnormalities.  I would ask her to see the orthopedic surgeons that did her hip replacement to see why she is having so much pain in her right leg.

## 2024-02-21 ENCOUNTER — APPOINTMENT (OUTPATIENT)
Dept: LAB | Facility: CLINIC | Age: 79
End: 2024-02-21
Payer: COMMERCIAL

## 2024-02-21 DIAGNOSIS — Z79.899 ENCOUNTER FOR LONG-TERM (CURRENT) USE OF OTHER MEDICATIONS: ICD-10-CM

## 2024-02-21 DIAGNOSIS — N18.31 STAGE 3A CHRONIC KIDNEY DISEASE (HCC): ICD-10-CM

## 2024-02-21 PROCEDURE — 80307 DRUG TEST PRSMV CHEM ANLYZR: CPT

## 2024-02-21 PROCEDURE — 36415 COLL VENOUS BLD VENIPUNCTURE: CPT

## 2024-02-21 PROCEDURE — 80373 DRUG SCREENING TRAMADOL: CPT

## 2024-02-21 PROCEDURE — 80048 BASIC METABOLIC PNL TOTAL CA: CPT

## 2024-02-22 LAB
ANION GAP SERPL CALCULATED.3IONS-SCNC: 9 MMOL/L
BUN SERPL-MCNC: 20 MG/DL (ref 5–25)
CALCIUM SERPL-MCNC: 8.8 MG/DL (ref 8.4–10.2)
CHLORIDE SERPL-SCNC: 103 MMOL/L (ref 96–108)
CO2 SERPL-SCNC: 29 MMOL/L (ref 21–32)
CREAT SERPL-MCNC: 0.8 MG/DL (ref 0.6–1.3)
GFR SERPL CREATININE-BSD FRML MDRD: 69 ML/MIN/1.73SQ M
GLUCOSE P FAST SERPL-MCNC: 94 MG/DL (ref 65–99)
POTASSIUM SERPL-SCNC: 4.1 MMOL/L (ref 3.5–5.3)
SODIUM SERPL-SCNC: 141 MMOL/L (ref 135–147)

## 2024-02-25 LAB
6MAM UR QL SCN: NEGATIVE NG/ML
ACCEPTABLE CREAT UR QL: 100 MG/DL
AMPHET UR QL SCN: NEGATIVE NG/ML
BARBITURATES UR QL SCN: NEGATIVE NG/ML
BENZODIAZ UR QL SCN: NEGATIVE NG/ML
BUPRENORPHINE UR QL CFM: NEGATIVE NG/ML
CANNABINOIDS UR QL SCN: NEGATIVE NG/ML
CARISOPRODOL UR QL: NEGATIVE NG/ML
COCAINE+BZE UR QL SCN: NEGATIVE NG/ML
ETHYL GLUCURONIDE UR QL SCN: NEGATIVE NG/ML
FENTANYL UR QL SCN: NEGATIVE NG/ML
GABAPENTIN SERPLBLD QL SCN: NEGATIVE UG/ML
METHADONE UR QL SCN: NEGATIVE NG/ML
N-NORTRAMADOL/CREAT UR CFM: 797 NG/MG CREAT
NITRITE UR QL STRIP: NEGATIVE UG/ML
O-NORTRAMADOL UR QL: >5000 NG/MG CREAT
OPIATES UR QL SCN: NEGATIVE NG/ML
OXYCODONE+OXYMORPHONE UR QL SCN: NEGATIVE NG/ML
PCP UR QL SCN: NEGATIVE NG/ML
PROPOXYPH UR QL SCN: NEGATIVE NG/ML
SPECIMEN PH ACCEPTABLE UR: 6.9 (ref 4.5–8.9)
TAPENTADOL UR QL SCN: NEGATIVE NG/ML
TRAMADOL UR QL CFM: >5000 NG/MG CREAT
TRAMADOL UR QL SCN: ABNORMAL NG/ML

## 2024-03-11 ENCOUNTER — APPOINTMENT (OUTPATIENT)
Dept: LAB | Facility: CLINIC | Age: 79
End: 2024-03-11
Payer: COMMERCIAL

## 2024-03-11 DIAGNOSIS — M80.00XA AGE-RELATED OSTEOPOROSIS WITH CURRENT PATHOLOGICAL FRACTURE, INITIAL ENCOUNTER: ICD-10-CM

## 2024-03-11 LAB — CALCIUM SERPL-MCNC: 8.7 MG/DL (ref 8.4–10.2)

## 2024-03-11 PROCEDURE — 36415 COLL VENOUS BLD VENIPUNCTURE: CPT

## 2024-03-23 DIAGNOSIS — I10 ESSENTIAL HYPERTENSION: ICD-10-CM

## 2024-03-23 DIAGNOSIS — F51.04 CHRONIC INSOMNIA: ICD-10-CM

## 2024-03-24 RX ORDER — TRAZODONE HYDROCHLORIDE 50 MG/1
TABLET ORAL
Qty: 30 TABLET | Refills: 5 | Status: SHIPPED | OUTPATIENT
Start: 2024-03-24

## 2024-03-24 RX ORDER — FUROSEMIDE 20 MG/1
20 TABLET ORAL DAILY
Qty: 30 TABLET | Refills: 5 | Status: SHIPPED | OUTPATIENT
Start: 2024-03-24

## 2024-04-08 ENCOUNTER — TRANSITIONAL CARE MANAGEMENT (OUTPATIENT)
Dept: FAMILY MEDICINE CLINIC | Facility: CLINIC | Age: 79
End: 2024-04-08

## 2024-04-08 ENCOUNTER — APPOINTMENT (OUTPATIENT)
Dept: LAB | Facility: CLINIC | Age: 79
End: 2024-04-08
Payer: COMMERCIAL

## 2024-04-08 DIAGNOSIS — E78.5 HYPERLIPIDEMIA, UNSPECIFIED HYPERLIPIDEMIA TYPE: ICD-10-CM

## 2024-04-08 DIAGNOSIS — I48.0 PAROXYSMAL ATRIAL FIBRILLATION (HCC): ICD-10-CM

## 2024-04-08 LAB
ANION GAP SERPL CALCULATED.3IONS-SCNC: 6 MMOL/L (ref 4–13)
AST SERPL W P-5'-P-CCNC: 24 U/L (ref 13–39)
BUN SERPL-MCNC: 23 MG/DL (ref 5–25)
CALCIUM SERPL-MCNC: 8.8 MG/DL (ref 8.4–10.2)
CHLORIDE SERPL-SCNC: 104 MMOL/L (ref 96–108)
CHOLEST SERPL-MCNC: 106 MG/DL
CK SERPL-CCNC: 27 U/L (ref 26–192)
CO2 SERPL-SCNC: 30 MMOL/L (ref 21–32)
CREAT SERPL-MCNC: 0.91 MG/DL (ref 0.6–1.3)
GFR SERPL CREATININE-BSD FRML MDRD: 59 ML/MIN/1.73SQ M
GLUCOSE P FAST SERPL-MCNC: 97 MG/DL (ref 65–99)
HDLC SERPL-MCNC: 51 MG/DL
LDLC SERPL CALC-MCNC: 40 MG/DL (ref 0–100)
MAGNESIUM SERPL-MCNC: 1.9 MG/DL (ref 1.9–2.7)
NONHDLC SERPL-MCNC: 55 MG/DL
POTASSIUM SERPL-SCNC: 4.1 MMOL/L (ref 3.5–5.3)
SODIUM SERPL-SCNC: 140 MMOL/L (ref 135–147)
TRIGL SERPL-MCNC: 74 MG/DL
TSH SERPL DL<=0.05 MIU/L-ACNC: 2.1 UIU/ML (ref 0.45–4.5)

## 2024-04-08 PROCEDURE — 80061 LIPID PANEL: CPT

## 2024-04-08 PROCEDURE — 84443 ASSAY THYROID STIM HORMONE: CPT

## 2024-04-08 PROCEDURE — 84450 TRANSFERASE (AST) (SGOT): CPT

## 2024-04-08 PROCEDURE — 82550 ASSAY OF CK (CPK): CPT

## 2024-04-08 PROCEDURE — 80048 BASIC METABOLIC PNL TOTAL CA: CPT

## 2024-04-08 PROCEDURE — 83735 ASSAY OF MAGNESIUM: CPT

## 2024-04-08 PROCEDURE — 36415 COLL VENOUS BLD VENIPUNCTURE: CPT

## 2024-04-09 ENCOUNTER — OFFICE VISIT (OUTPATIENT)
Dept: FAMILY MEDICINE CLINIC | Facility: CLINIC | Age: 79
End: 2024-04-09
Payer: COMMERCIAL

## 2024-04-09 VITALS
HEIGHT: 64 IN | SYSTOLIC BLOOD PRESSURE: 114 MMHG | HEART RATE: 84 BPM | WEIGHT: 148.8 LBS | DIASTOLIC BLOOD PRESSURE: 64 MMHG | BODY MASS INDEX: 25.4 KG/M2 | OXYGEN SATURATION: 98 %

## 2024-04-09 DIAGNOSIS — F33.9 DEPRESSION, RECURRENT (HCC): ICD-10-CM

## 2024-04-09 DIAGNOSIS — E78.49 OTHER HYPERLIPIDEMIA: ICD-10-CM

## 2024-04-09 DIAGNOSIS — I10 ESSENTIAL HYPERTENSION: ICD-10-CM

## 2024-04-09 DIAGNOSIS — L30.9 DERMATITIS: ICD-10-CM

## 2024-04-09 PROCEDURE — 99496 TRANSJ CARE MGMT HIGH F2F 7D: CPT | Performed by: INTERNAL MEDICINE

## 2024-04-09 RX ORDER — DILTIAZEM HYDROCHLORIDE 120 MG/1
120 CAPSULE, COATED, EXTENDED RELEASE ORAL DAILY
COMMUNITY
Start: 2024-04-05 | End: 2024-08-03

## 2024-04-09 RX ORDER — BETAMETHASONE DIPROPIONATE 0.5 MG/G
CREAM TOPICAL 2 TIMES DAILY PRN
Qty: 45 G | Refills: 0 | Status: SHIPPED | OUTPATIENT
Start: 2024-04-09

## 2024-04-09 RX ORDER — METOPROLOL TARTRATE 100 MG/1
100 TABLET ORAL EVERY 12 HOURS SCHEDULED
Qty: 60 TABLET | Refills: 5 | Status: SHIPPED | OUTPATIENT
Start: 2024-04-09

## 2024-04-09 RX ORDER — TRAMADOL HYDROCHLORIDE 50 MG/1
25 TABLET ORAL 3 TIMES DAILY
Status: SHIPPED | COMMUNITY
Start: 2024-04-09

## 2024-04-09 RX ORDER — FAMOTIDINE 20 MG/1
20 TABLET, FILM COATED ORAL EVERY 12 HOURS
COMMUNITY
Start: 2024-04-05 | End: 2024-04-09

## 2024-04-09 RX ORDER — FUROSEMIDE 20 MG/1
20 TABLET ORAL DAILY PRN
Qty: 30 TABLET | Refills: 5 | Status: SHIPPED | OUTPATIENT
Start: 2024-04-09

## 2024-04-09 RX ORDER — ATORVASTATIN CALCIUM 40 MG/1
40 TABLET, FILM COATED ORAL DAILY
Qty: 90 TABLET | Refills: 3 | Status: SHIPPED | OUTPATIENT
Start: 2024-04-09

## 2024-04-09 NOTE — PROGRESS NOTES
Assessment & Plan  1. Resolution of vaginal bleeding.  The bleeding is likely a consequence of a urinary tract infection. A urinalysis will be conducted in the future.    2. Atrial fibrillation accompanied by rapid ventricular response.  The patient's condition has shown improvement with the increased dosage of metoprolol and the addition of diltiazem. A follow-up appointment with the cardiologist is planned, with the consideration of a Watchman procedure.    3.  Episodic GERD  The patient does not report any heartburn unless she consumes sauce. The use of famotidine will be discontinued.          Subjective     Transitional Care Management Review:   Dian Tracey is a 78 y.o. female here for TCM follow up.     During the TCM phone call patient stated:  TCM Call       Date and time call was made  4/8/2024 11:12 AM    Hospital care reviewed  Records reviewed    Patient was hospitialized at  Other (comment)    Comment  Formerly [facility]    Date of Admission  03/29/24    Date of discharge  04/05/24    Disposition  Home    Were the patients medications reviewed and updated  Yes    Current Symptoms  None          TCM Call       Post hospital issues  None    Should patient be enrolled in anticoag monitoring?  No    Scheduled for follow up?  Yes    I have advised the patient to call PCP with any new or worsening symptoms  debbie pina    Living Arrangements  Family members; Spouse or Significiant other    Support System  Spouse; Children; Friends    The type of support provided  Emotional; Financial; Physical    Do you have social support  Yes, as much as I need          History of Present Illness  The patient is an 78-year-old female who presents for evaluation of multiple medical concerns.    The patient was admitted to the hospital on on March 29 due to vaginal bleeding. The day prior, she noticed blood on her pad and toilet bowl, prompting her to seek emergency care the following day. Initially, it was hypothesized that  "the bleeding was related to her uterus, but she is actually status post hysterectomy. However, it was concluded that the bleeding could be attributed to her Eliquis medication, which was discontinued for a few days.  She was noticed to have blood in her urine and was treated with intravenous ceftriaxone for 3 days.  Currently, she is not experiencing any bleeding.    The patient was diagnosed with atrial fibrillation with a rapid ventricular response. During her hospital stay, she was administered an IV Cardizem drip, which unfortunately did not yield any improvement. Despite the recommendation for electric shock therapy, it was decided against this option due to a potential blood clot in her heart. She denies experiencing any symptoms of tachycardia. Post-discharge, she reports feeling unwell in the morning, which she attributes to her morning medications. She describes a persistent urge to vomit and a lack of interest in activities. Her Eliquis medication was reinstated. Her medication regimen also includes diltiazem, famotidine, and Eliquis. Amlodipine, valsartan, and colchicine were discontinued due to potential interactions with diltiazem.    Supplemental Information  She receives a Prolia injection every 6 months from her arthritis doctor. She uses the cream as needed.        Objective     /64 (BP Location: Right arm, Patient Position: Sitting, Cuff Size: Large)   Pulse 84   Ht 5' 4\" (1.626 m)   Wt 67.5 kg (148 lb 12.8 oz)   SpO2 98%   BMI 25.54 kg/m²      Physical Exam  Constitutional:       General: She is not in acute distress.     Appearance: Normal appearance. She is well-developed. She is not ill-appearing.   Neck:      Vascular: No JVD.   Cardiovascular:      Rate and Rhythm: Rhythm irregular.      Heart sounds: Normal heart sounds. No murmur heard.     No friction rub. No gallop.   Pulmonary:      Breath sounds: Normal breath sounds.   Abdominal:      General: Bowel sounds are normal. There " is no distension.      Palpations: Abdomen is soft. There is no mass.   Musculoskeletal:         General: No swelling.   Neurological:      Mental Status: She is alert.       Medications have been reviewed by provider in current encounter    Fransisco Duncan MD

## 2024-04-22 ENCOUNTER — APPOINTMENT (OUTPATIENT)
Dept: LAB | Facility: CLINIC | Age: 79
End: 2024-04-22
Payer: COMMERCIAL

## 2024-04-22 ENCOUNTER — OFFICE VISIT (OUTPATIENT)
Dept: FAMILY MEDICINE CLINIC | Facility: CLINIC | Age: 79
End: 2024-04-22
Payer: COMMERCIAL

## 2024-04-22 ENCOUNTER — LAB REQUISITION (OUTPATIENT)
Dept: LAB | Facility: HOSPITAL | Age: 79
End: 2024-04-22
Payer: COMMERCIAL

## 2024-04-22 VITALS
HEIGHT: 64 IN | SYSTOLIC BLOOD PRESSURE: 118 MMHG | OXYGEN SATURATION: 98 % | DIASTOLIC BLOOD PRESSURE: 68 MMHG | HEART RATE: 77 BPM | BODY MASS INDEX: 25.54 KG/M2

## 2024-04-22 DIAGNOSIS — I48.0 PAROXYSMAL ATRIAL FIBRILLATION (HCC): ICD-10-CM

## 2024-04-22 DIAGNOSIS — I10 ESSENTIAL HYPERTENSION: ICD-10-CM

## 2024-04-22 DIAGNOSIS — E83.51 HYPOCALCEMIA: ICD-10-CM

## 2024-04-22 DIAGNOSIS — R31.0 GROSS HEMATURIA: ICD-10-CM

## 2024-04-22 DIAGNOSIS — R31.0 GROSS HEMATURIA: Primary | ICD-10-CM

## 2024-04-22 DIAGNOSIS — R60.0 BILATERAL LOWER EXTREMITY EDEMA: ICD-10-CM

## 2024-04-22 LAB
25(OH)D3 SERPL-MCNC: 56.7 NG/ML (ref 30–100)
BACTERIA UR QL AUTO: ABNORMAL /HPF
BILIRUB UR QL STRIP: NEGATIVE
CA-I BLD-SCNC: 1.19 MMOL/L (ref 1.12–1.32)
CALCIUM SERPL-MCNC: 8.8 MG/DL (ref 8.4–10.2)
CLARITY UR: CLEAR
COLOR UR: ABNORMAL
GLUCOSE UR STRIP-MCNC: NEGATIVE MG/DL
HGB UR QL STRIP.AUTO: NEGATIVE
KETONES UR STRIP-MCNC: NEGATIVE MG/DL
LEUKOCYTE ESTERASE UR QL STRIP: ABNORMAL
NITRITE UR QL STRIP: NEGATIVE
NON-SQ EPI CELLS URNS QL MICRO: ABNORMAL /HPF
PH UR STRIP.AUTO: 6 [PH]
PROT UR STRIP-MCNC: NEGATIVE MG/DL
RBC #/AREA URNS AUTO: ABNORMAL /HPF
SP GR UR STRIP.AUTO: 1.01 (ref 1–1.03)
UROBILINOGEN UR STRIP-ACNC: <2 MG/DL
WBC #/AREA URNS AUTO: ABNORMAL /HPF

## 2024-04-22 PROCEDURE — 99214 OFFICE O/P EST MOD 30 MIN: CPT | Performed by: INTERNAL MEDICINE

## 2024-04-22 PROCEDURE — G2211 COMPLEX E/M VISIT ADD ON: HCPCS | Performed by: INTERNAL MEDICINE

## 2024-04-22 PROCEDURE — 36415 COLL VENOUS BLD VENIPUNCTURE: CPT

## 2024-04-22 PROCEDURE — 82330 ASSAY OF CALCIUM: CPT

## 2024-04-22 PROCEDURE — 82306 VITAMIN D 25 HYDROXY: CPT

## 2024-04-22 PROCEDURE — 81001 URINALYSIS AUTO W/SCOPE: CPT | Performed by: INTERNAL MEDICINE

## 2024-04-22 RX ORDER — FUROSEMIDE 40 MG/1
40 TABLET ORAL DAILY PRN
Qty: 30 TABLET | Refills: 5 | Status: SHIPPED | OUTPATIENT
Start: 2024-04-22

## 2024-04-22 NOTE — PROGRESS NOTES
Name: Dian Tracey      : 1945      MRN: [id number]  Encounter Provider: Fransisco Duncan MD  Encounter Date: 2024   Encounter department: [facility] PRIMARY CARE    Assessment & Plan     Assessment & Plan  1. Hematuria.  Urine dip here in the office was positive for blood.  A urine culture will be conducted to exclude the possibility of an infection. Additionally, a referral to a urologist will be made for a potential cystoscopy to exclude the possibility of bladder cancer.    2. Lower extremity edema.  The patient's furosemide dosage will be escalated to 40 mg daily. The patient has been advised to consult her cardiologist regarding the use of Cartia XT.         Subjective      History of Present Illness  The patient is an 78-year-old female who presents for evaluation of multiple medical concerns.    The patient began experiencing hematuria last Wednesday morning, a condition she has been observing daily since then, with the exception of today. She initially consulted her cardiologist because she was concerned that the hematuria was a side effect of her Eliquis medication during her hospital stay. Upon contacting her cardiologist's office, the nurse confirmed that the hematuria was not attributable to her Eliquis and advised her to consult with me. The hematuria is characterized by a pink-tinged color. It is noteworthy that she underwent a vaginal hysterectomy approximately 2 years ago.    The patient reports experiencing edema in her lower extremities, specifically in her ankles. This has resulted in a weight gain of 5 pounds within a 5-day period. She is currently on furosemide 20 mg daily. Prior to her hospitalization, she had minimal complaints of ankle swelling. However, she notes that her ankles are consistently swollen in the morning. The patient has a history of atrial fibrillation and is currently on Cartia XT. She has a follow-up appointment with her cardiologist in 2  "days.    Supplemental Information  She saw her arthritis doctor last week who wanted to give her calcium injection, but she was unable to do it because her calcium is low. She did not feel good those days and is feeling good now.        Objective     /68 (BP Location: Left arm, Patient Position: Sitting, Cuff Size: Large)   Pulse 77   Ht 5' 4\" (1.626 m)   SpO2 98%   BMI 25.54 kg/m²     Physical Exam    Physical Exam  Constitutional:       General: She is not in acute distress.     Appearance: Normal appearance. She is not ill-appearing.   Musculoskeletal:         General: Swelling (2+ 1/3 the way up her legs bilaterally) present.   Neurological:      Mental Status: She is alert.           "

## 2024-04-29 ENCOUNTER — TELEPHONE (OUTPATIENT)
Dept: UROLOGY | Facility: CLINIC | Age: 79
End: 2024-04-29

## 2024-04-29 NOTE — TELEPHONE ENCOUNTER
Called and left message for patient to call the office back. Just wanted to see if patient has ever been seen by urology before. If so who has she seen?

## 2024-05-01 ENCOUNTER — CONSULT (OUTPATIENT)
Dept: UROLOGY | Facility: CLINIC | Age: 79
End: 2024-05-01
Payer: COMMERCIAL

## 2024-05-01 VITALS
BODY MASS INDEX: 25.27 KG/M2 | WEIGHT: 148 LBS | OXYGEN SATURATION: 96 % | HEIGHT: 64 IN | TEMPERATURE: 97.6 F | RESPIRATION RATE: 18 BRPM | HEART RATE: 71 BPM | SYSTOLIC BLOOD PRESSURE: 126 MMHG | DIASTOLIC BLOOD PRESSURE: 78 MMHG

## 2024-05-01 DIAGNOSIS — R31.0 GROSS HEMATURIA: Primary | ICD-10-CM

## 2024-05-01 LAB
SL AMB  POCT GLUCOSE, UA: ABNORMAL
SL AMB LEUKOCYTE ESTERASE,UA: ABNORMAL
SL AMB POCT BILIRUBIN,UA: ABNORMAL
SL AMB POCT BLOOD,UA: ABNORMAL
SL AMB POCT CLARITY,UA: ABNORMAL
SL AMB POCT COLOR,UA: YELLOW
SL AMB POCT KETONES,UA: ABNORMAL
SL AMB POCT NITRITE,UA: ABNORMAL
SL AMB POCT PH,UA: 5.5
SL AMB POCT SPECIFIC GRAVITY,UA: 1.02
SL AMB POCT URINE PROTEIN: ABNORMAL
SL AMB POCT UROBILINOGEN: 0.2

## 2024-05-01 PROCEDURE — 3074F SYST BP LT 130 MM HG: CPT | Performed by: UROLOGY

## 2024-05-01 PROCEDURE — 81003 URINALYSIS AUTO W/O SCOPE: CPT | Performed by: UROLOGY

## 2024-05-01 PROCEDURE — 1160F RVW MEDS BY RX/DR IN RCRD: CPT | Performed by: UROLOGY

## 2024-05-01 PROCEDURE — 99204 OFFICE O/P NEW MOD 45 MIN: CPT | Performed by: UROLOGY

## 2024-05-01 PROCEDURE — 1159F MED LIST DOCD IN RCRD: CPT | Performed by: UROLOGY

## 2024-05-01 PROCEDURE — 3078F DIAST BP <80 MM HG: CPT | Performed by: UROLOGY

## 2024-05-01 NOTE — PROGRESS NOTES
UROLOGY PROGRESS NOTE         NAME: Dian Tracey  AGE: 78 y.o. SEX: female  : 1945   MRN: [id number]    DATE: 2024  TIME: 2:31 PM    Assessment and Plan      Impression:   1. Gross hematuria  -     Ambulatory Referral to Urology  -     POCT urine dip auto non-scope         Plan: Plan flexible cystoscopy.  Patient had gross hematuria.  Last episode was .  Had a total hysterectomy in the past.  CT scan images reviewed.  Some artifact in the pelvis related to her hip prosthesis but upper tracts look fine.  No delayed images but also no evidence of hydronephrosis or stone.      Chief Complaint     Chief Complaint   Patient presents with   • New Patient Visit     Patient here today as a new patient due to having blood noted in her urine. Was referred by her PCP. Patient said that she had blood noted in her urine since . She did see blood noted in her urine in the past. Has not seen it recently. Patient said that she is not prone to UTI's       History of Present Illness     HPI: Dian Tracey is a 78 y.o. year old female who presents with gross hematuria.  Painless.  Episodic between mid April and mid March.  Hysterectomy in the past .  CT unremarkable              The following portions of the patient's history were reviewed and updated as appropriate: allergies, current medications, past family history, past medical history, past social history, past surgical history and problem list.  Past Medical History:   Diagnosis Date   • Aneurysm (HCC)     Followed by Dr. Hutchison in [place].  CTA  2018 Stable left C6 aneurysm   • Arthritis    • Atrial fibrillation (HCC)    • Insomnia     Has been taking trazodone for sleep for at least a decade.  No side effects.  She can't sleep without the trazodone.   • Multinodular goiter 2016    Throid US confirmed multinodular goiter.   • Stroke (HCC)      Past Surgical History:   Procedure Laterality Date   • APPENDECTOMY     • BUNIONECTOMY     •  "CATARACT EXTRACTION W/ INTRAOCULAR LENS  IMPLANT, BILATERAL     • TOTAL HIP ARTHROPLASTY Right 03/17/2023    Broken femur with right hip replacement   • TOTAL VAGINAL HYSTERECTOMY       shoulder  Review of Systems     Const: Denies chills, fever and weight loss.  CV: Denies chest pain.  Resp: Denies SOB.  GI: Denies abdominal pain, nausea and vomiting.  : Denies symptoms other than stated above.  Musculo: Denies back pain.    Objective   /78   Pulse 71   Temp 97.6 °F (36.4 °C) (Tympanic)   Resp 18   Ht 5' 4\" (1.626 m)   Wt 67.1 kg (148 lb)   SpO2 96%   BMI 25.40 kg/m²     Physical Exam  Const: Appears healthy and well developed. No signs of acute distress present.  Resp: Respirations are regular and unlabored.   CV: Rate is regular. Rhythm is regular.  Abdomen: Abdomen is soft, nontender, and nondistended. Kidneys are not palpable.  : Not performed  Psych: Patient's attitude is cooperative. Mood is normal. Affect is normal.    Procedure   Procedures     Current Medications     Current Outpatient Medications:   •  atorvastatin (LIPITOR) 40 mg tablet, Take 1 tablet (40 mg total) by mouth daily, Disp: 90 tablet, Rfl: 3  •  betamethasone dipropionate (DIPROSONE) 0.05 % cream, Apply topically 2 (two) times a day as needed for rash, Disp: 45 g, Rfl: 0  •  Calcium Carbonate 1500 (600 Ca) MG TABS, 1,200 mg, Disp: , Rfl:   •  diltiazem (Cardizem CD) 120 mg 24 hr capsule, Take 120 mg by mouth daily, Disp: , Rfl:   •  Eliquis 5 MG, take 1 tablet by mouth twice a day, Disp: 60 tablet, Rfl: 5  •  furosemide (LASIX) 40 mg tablet, Take 1 tablet (40 mg total) by mouth daily as needed (edema), Disp: 30 tablet, Rfl: 5  •  metoprolol tartrate (LOPRESSOR) 100 mg tablet, Take 1 tablet (100 mg total) by mouth every 12 (twelve) hours, Disp: 60 tablet, Rfl: 5  •  Multiple Vitamins-Minerals (MULTIVITAMIN ADULT PO), Take 1 tablet by mouth daily, Disp: , Rfl:   •  rOPINIRole (REQUIP) 0.5 mg tablet, Take 1 tablet (0.5 mg " total) by mouth 3 (three) times a day, Disp: 180 tablet, Rfl: 3  •  traMADol (ULTRAM) 50 mg tablet, Take 0.5 tablets (25 mg total) by mouth 3 (three) times a day, Disp: , Rfl:   •  traZODone (DESYREL) 50 mg tablet, take 1 tablet by mouth at bedtime, Disp: 30 tablet, Rfl: 5  •  denosumab (Prolia) 60 mg/mL, Inject 60 mg under the skin once (Patient not taking: Reported on 5/1/2024), Disp: , Rfl:         Davis Mcnair MD

## 2024-05-21 ENCOUNTER — PROCEDURE VISIT (OUTPATIENT)
Dept: UROLOGY | Facility: CLINIC | Age: 79
End: 2024-05-21
Payer: COMMERCIAL

## 2024-05-21 VITALS
SYSTOLIC BLOOD PRESSURE: 172 MMHG | HEART RATE: 83 BPM | TEMPERATURE: 97 F | OXYGEN SATURATION: 98 % | BODY MASS INDEX: 25.54 KG/M2 | DIASTOLIC BLOOD PRESSURE: 84 MMHG | HEIGHT: 64 IN | WEIGHT: 149.6 LBS

## 2024-05-21 DIAGNOSIS — R31.0 GROSS HEMATURIA: Primary | ICD-10-CM

## 2024-05-21 PROCEDURE — 52000 CYSTOURETHROSCOPY: CPT | Performed by: UROLOGY

## 2024-05-21 RX ORDER — AMLODIPINE BESYLATE 5 MG/1
5 TABLET ORAL DAILY
COMMUNITY
Start: 2024-05-13

## 2024-05-21 NOTE — PROGRESS NOTES
Cystoscopy     Date/Time  5/21/2024 10:00 AM     Performed by  Clarence Mcnair MD   Authorized by  Clarence Mcnair MD     Universal Protocol:  Consent: Verbal consent obtained. Written consent obtained.  Risks and benefits: risks, benefits and alternatives were discussed  Consent given by: patient  Patient understanding: patient states understanding of the procedure being performed  Patient consent: the patient's understanding of the procedure matches consent given  Procedure consent: procedure consent matches procedure scheduled  Relevant documents: relevant documents present and verified  Patient identity confirmed: verbally with patient      Procedure Details:  Procedure type: cystoscopy    Patient tolerance: Patient tolerated the procedure well with no immediate complications    Additional Procedure Details: Patient with history of gross hematuria and UTIs.  Upper urinary tract unremarkable on CT.  Patient here for cystoscopy.  Patient was brought to cystoscopy suite identified timeout was satisfactory.  She placed in lithotomy position vagina was prepped and draped in usual sterile fashion using Betadine.  Lidocaine jelly was instilled per urethra.  Flexible cystoscopy performed.  Urethra was normal the bladder is normal.  There is an no bladder tumor stone or diverticula.  The scope was retroflexed and the bladder outlet appears normal.  The right and left ureteral orifice are in normal position and both effluxed clear urine.  The scope was then removed.  Patient tolerated the procedure well.  Findings were reviewed.  Patient had no further hematuria.  Will see her back in 6 months with a urinalysis.  She will contact us if she has any recurrent hematuria.  She received a single dose of Cipro today 500 mg p.o.

## 2024-07-05 DIAGNOSIS — G25.81 RESTLESS LEGS SYNDROME: ICD-10-CM

## 2024-07-05 DIAGNOSIS — G47.9 DIFFICULTY SLEEPING: ICD-10-CM

## 2024-07-06 RX ORDER — ROPINIROLE 0.5 MG/1
0.5 TABLET, FILM COATED ORAL 3 TIMES DAILY
Qty: 180 TABLET | Refills: 1 | Status: SHIPPED | OUTPATIENT
Start: 2024-07-06

## 2024-07-17 ENCOUNTER — RA CDI HCC (OUTPATIENT)
Dept: OTHER | Facility: HOSPITAL | Age: 79
End: 2024-07-17

## 2024-07-22 ENCOUNTER — RA CDI HCC (OUTPATIENT)
Dept: OTHER | Facility: HOSPITAL | Age: 79
End: 2024-07-22

## 2024-07-29 ENCOUNTER — OFFICE VISIT (OUTPATIENT)
Dept: FAMILY MEDICINE CLINIC | Facility: CLINIC | Age: 79
End: 2024-07-29
Payer: COMMERCIAL

## 2024-07-29 VITALS
WEIGHT: 148.4 LBS | BODY MASS INDEX: 25.33 KG/M2 | HEART RATE: 58 BPM | OXYGEN SATURATION: 97 % | SYSTOLIC BLOOD PRESSURE: 122 MMHG | HEIGHT: 64 IN | DIASTOLIC BLOOD PRESSURE: 52 MMHG

## 2024-07-29 DIAGNOSIS — I48.0 PAROXYSMAL ATRIAL FIBRILLATION (HCC): ICD-10-CM

## 2024-07-29 DIAGNOSIS — I10 ESSENTIAL HYPERTENSION: Primary | ICD-10-CM

## 2024-07-29 DIAGNOSIS — N18.31 STAGE 3A CHRONIC KIDNEY DISEASE (HCC): ICD-10-CM

## 2024-07-29 PROCEDURE — 99214 OFFICE O/P EST MOD 30 MIN: CPT | Performed by: INTERNAL MEDICINE

## 2024-07-29 PROCEDURE — 3078F DIAST BP <80 MM HG: CPT | Performed by: INTERNAL MEDICINE

## 2024-07-29 PROCEDURE — 3288F FALL RISK ASSESSMENT DOCD: CPT | Performed by: INTERNAL MEDICINE

## 2024-07-29 PROCEDURE — 1101F PT FALLS ASSESS-DOCD LE1/YR: CPT | Performed by: INTERNAL MEDICINE

## 2024-07-29 PROCEDURE — 3074F SYST BP LT 130 MM HG: CPT | Performed by: INTERNAL MEDICINE

## 2024-07-29 PROCEDURE — 3725F SCREEN DEPRESSION PERFORMED: CPT | Performed by: INTERNAL MEDICINE

## 2024-07-29 PROCEDURE — 1125F AMNT PAIN NOTED PAIN PRSNT: CPT | Performed by: INTERNAL MEDICINE

## 2024-07-29 PROCEDURE — 1159F MED LIST DOCD IN RCRD: CPT | Performed by: INTERNAL MEDICINE

## 2024-07-29 PROCEDURE — G0439 PPPS, SUBSEQ VISIT: HCPCS | Performed by: INTERNAL MEDICINE

## 2024-07-29 PROCEDURE — 1170F FXNL STATUS ASSESSED: CPT | Performed by: INTERNAL MEDICINE

## 2024-07-29 PROCEDURE — 1160F RVW MEDS BY RX/DR IN RCRD: CPT | Performed by: INTERNAL MEDICINE

## 2024-07-29 RX ORDER — METOPROLOL TARTRATE 100 MG/1
50 TABLET ORAL EVERY 12 HOURS SCHEDULED
Qty: 60 TABLET | Refills: 3 | Status: SHIPPED | OUTPATIENT
Start: 2024-07-29

## 2024-07-29 NOTE — ASSESSMENT & PLAN NOTE
Good blood pressure control on current regimen.  I told her that I didn't think that she needed to be on valsartan especially since it sounds like it dropped her blood pressure.  I reviewed her notes from the cardiologist and there was no mention of valsartan so not sure how that got started.

## 2024-07-29 NOTE — ASSESSMENT & PLAN NOTE
She doesn't have any symptoms when she has atrial fibrillation.  Her heart seems regular here today.  Continue Eliquis and follow-up with cardiology.

## 2024-07-29 NOTE — ASSESSMENT & PLAN NOTE
Lab Results   Component Value Date    EGFR 59 04/08/2024    EGFR 74.35 04/02/2024    EGFR 80.2 04/02/2024    CREATININE 0.91 04/08/2024    CREATININE 0.75 04/02/2024    CREATININE 0.77 03/31/2024       Last creatinine increased to 0.91 with GFR of 59.  Will continue to monitor.

## 2024-07-29 NOTE — PROGRESS NOTES
Ambulatory Visit  Name: Dian Tracey      : 1945      MRN: [id number]  Encounter Provider: Fransisco Duncan MD  Encounter Date: 2024   Encounter department: [facility] PRIMARY CARE    Assessment & Plan  Essential hypertension  Good blood pressure control on current regimen.  I told her that I didn't think that she needed to be on valsartan especially since it sounds like it dropped her blood pressure.  I reviewed her notes from the cardiologist and there was no mention of valsartan so not sure how that got started.  Paroxysmal atrial fibrillation (HCC)  She doesn't have any symptoms when she has atrial fibrillation.  Her heart seems regular here today.  Continue Eliquis and follow-up with cardiology.  Stage 3a chronic kidney disease (HCC)  Lab Results   Component Value Date    EGFR 59 2024    EGFR 74.35 2024    EGFR 80.2 2024    CREATININE 0.91 2024    CREATININE 0.75 2024    CREATININE 0.77 2024       Last creatinine increased to 0.91 with GFR of 59.  Will continue to monitor.          Preventive health issues were discussed with patient, and age appropriate screening tests were ordered as noted in patient's After Visit Summary. Personalized health advice and appropriate referrals for health education or preventive services given if needed, as noted in patient's After Visit Summary.    History of Present Illness        History of Present Illness  The patient is an 78-year-old female who presents for a wellness visit and regular follow-up visit.    The patient underwent cardioversion at [facility] for atrial fibrillation, which yielded positive results. Subsequently, she was prescribed Valsartan, which she discontinued after a few days due to adverse effects including severe illness, difficulty walking, dizziness, and fainting. Despite these symptoms, she reported a significant improvement in her condition since the cardioversion. Currently, she is  reluctant to take additional medication. Her daily activities are limited, albeit slowly. Her blood pressure fluctuates, but she does not maintain a record of these readings. Her current medication regimen includes amlodipine 5 mg, metoprolol, and diltiazem. She reports no episodes of tachycardia. Earlier this year, she was hospitalized due to hematuria, which was attributed to either her bladder or Eliquis. However, her bladder was evaluated at [facility], yielding normal results. She is currently under the care of a urologist. Her memory remains unchanged, although she occasionally misses words since her stroke.       Review of Systems  Medical History Reviewed by provider this encounter:       Annual Wellness Visit Questionnaire       Health Risk Assessment:   Patient rates overall health as good. Patient feels that their physical health rating is much better. Patient is satisfied with their life. Eyesight was rated as same. Hearing was rated as same. Patient feels that their emotional and mental health rating is same. Patients states they are never, rarely angry. Patient states they are never, rarely unusually tired/fatigued. Pain experienced in the last 7 days has been none. Patient states that she has experienced no weight loss or gain in last 6 months.     Depression Screening:   PHQ-9 Score: 4      Fall Risk Screening:   In the past year, patient has experienced: no history of falling in past year      Urinary Incontinence Screening:   Patient has not leaked urine accidently in the last six months.     Home Safety:  Patient has trouble with stairs inside or outside of their home. Patient has working smoke alarms and has no working carbon monoxide detector. Home safety hazards include: none.     Nutrition:   Current diet is Regular.     Medications:   Patient is not currently taking any over-the-counter supplements. Patient is able to manage medications.     Activities of Daily Living (ADLs)/Instrumental  Activities of Daily Living (IADLs):   Walk and transfer into and out of bed and chair?: Yes  Dress and groom yourself?: Yes    Bathe or shower yourself?: Yes    Feed yourself? Yes  Do your laundry/housekeeping?: Yes  Manage your money, pay your bills and track your expenses?: Yes  Make your own meals?: Yes    Do your own shopping?: Yes    Previous Hospitalizations:   Any hospitalizations or ED visits within the last 12 months?: Yes    How many hospitalizations have you had in the last year?: 1-2    Hospitalization Comments: Hospitalized earlier this year for hematuria.    Advance Care Planning:   Living will: No    Advanced directive: Yes    Advanced directive counseling given: Yes    ACP document given: Yes    End of Life Decisions reviewed with patient: Yes    Provider agrees with end of life decisions: Yes      Comments: Dian chooses comfort care measures in the event of a terminal diagnosis.       Cognitive Screening:   Provider or family/friend/caregiver concerned regarding cognition?: No    PREVENTIVE SCREENINGS      Cardiovascular Screening:    General: Screening Not Indicated and History Lipid Disorder      Diabetes Screening:     General: Screening Current      Colorectal Cancer Screening:     General: Screening Not Indicated      Breast Cancer Screening:     General: Screening Current      Cervical Cancer Screening:    General: Screening Not Indicated      Osteoporosis Screening:    General: Screening Not Indicated and History Osteoporosis      Abdominal Aortic Aneurysm (AAA) Screening:        General: Screening Not Indicated      Lung Cancer Screening:     General: Screening Not Indicated      Hepatitis C Screening:    General: Screening Current    Screening, Brief Intervention, and Referral to Treatment (SBIRT)    Screening  Typical number of drinks in a day: 0  Typical number of drinks in a week: 0  Interpretation: Low risk drinking behavior.    Review of Current Opioid Use    Opioid Risk Tool (ORT)  "Interpretation: Complete Opioid Risk Tool (ORT)    Social Determinants of Health     Financial Resource Strain: Low Risk  (7/24/2023)    Overall Financial Resource Strain (CARDIA)     Difficulty of Paying Living Expenses: Not very hard   Food Insecurity: Unknown (7/29/2024)    Hunger Vital Sign     Worried About Running Out of Food in the Last Year: Never true   Transportation Needs: No Transportation Needs (7/29/2024)    PRAPARE - Transportation     Lack of Transportation (Medical): No     Lack of Transportation (Non-Medical): No   Housing Stability: Unknown (7/29/2024)    Housing Stability Vital Sign     Unable to Pay for Housing in the Last Year: No     Homeless in the Last Year: No   Utilities: Not At Risk (7/29/2024)    [facility] Utilities     Threatened with loss of utilities: No     No results found.    Objective     /52 (BP Location: Right arm, Patient Position: Sitting, Cuff Size: Standard)   Pulse 58   Ht 5' 4\" (1.626 m)   Wt 67.3 kg (148 lb 6.4 oz)   SpO2 97%   BMI 25.47 kg/m²     Physical Exam  Constitutional:       General: She is not in acute distress.     Appearance: Normal appearance. She is well-developed. She is not ill-appearing.   Neck:      Vascular: No JVD.   Cardiovascular:      Rate and Rhythm: Normal rate and regular rhythm.      Heart sounds: Normal heart sounds. No murmur heard.     No friction rub. No gallop.   Pulmonary:      Breath sounds: Normal breath sounds.   Abdominal:      General: Bowel sounds are normal. There is no distension.      Palpations: Abdomen is soft. There is no mass.   Musculoskeletal:         General: No swelling.   Neurological:      Mental Status: She is alert.           "

## 2024-09-19 DIAGNOSIS — F51.04 CHRONIC INSOMNIA: ICD-10-CM

## 2024-09-19 DIAGNOSIS — I10 ESSENTIAL HYPERTENSION: ICD-10-CM

## 2024-09-19 RX ORDER — TRAZODONE HYDROCHLORIDE 50 MG/1
TABLET, FILM COATED ORAL
Qty: 30 TABLET | Refills: 5 | Status: SHIPPED | OUTPATIENT
Start: 2024-09-19

## 2024-09-19 RX ORDER — FUROSEMIDE 40 MG
40 TABLET ORAL DAILY PRN
Qty: 90 TABLET | Refills: 1 | Status: SHIPPED | OUTPATIENT
Start: 2024-09-19

## 2024-10-17 ENCOUNTER — HOSPITAL ENCOUNTER (EMERGENCY)
Facility: HOSPITAL | Age: 79
Discharge: HOME | End: 2024-10-17
Attending: STUDENT IN AN ORGANIZED HEALTH CARE EDUCATION/TRAINING PROGRAM
Payer: COMMERCIAL

## 2024-10-17 ENCOUNTER — APPOINTMENT (EMERGENCY)
Dept: RADIOLOGY | Facility: HOSPITAL | Age: 79
End: 2024-10-17
Payer: COMMERCIAL

## 2024-10-17 VITALS
WEIGHT: 148 LBS | RESPIRATION RATE: 16 BRPM | SYSTOLIC BLOOD PRESSURE: 128 MMHG | TEMPERATURE: 97.3 F | BODY MASS INDEX: 25.4 KG/M2 | DIASTOLIC BLOOD PRESSURE: 65 MMHG | HEART RATE: 76 BPM | OXYGEN SATURATION: 99 %

## 2024-10-17 DIAGNOSIS — I48.91 ATRIAL FIBRILLATION WITH RVR (HCC): ICD-10-CM

## 2024-10-17 DIAGNOSIS — M25.551 RIGHT HIP PAIN: Primary | ICD-10-CM

## 2024-10-17 LAB
ANION GAP SERPL CALCULATED.3IONS-SCNC: 7 MMOL/L (ref 4–13)
BASOPHILS # BLD AUTO: 0.05 THOUSANDS/ΜL (ref 0–0.1)
BASOPHILS NFR BLD AUTO: 1 % (ref 0–1)
BUN SERPL-MCNC: 20 MG/DL (ref 5–25)
CALCIUM SERPL-MCNC: 8.6 MG/DL (ref 8.4–10.2)
CHLORIDE SERPL-SCNC: 102 MMOL/L (ref 96–108)
CO2 SERPL-SCNC: 29 MMOL/L (ref 21–32)
CREAT SERPL-MCNC: 0.89 MG/DL (ref 0.6–1.3)
EOSINOPHIL # BLD AUTO: 0.14 THOUSAND/ΜL (ref 0–0.61)
EOSINOPHIL NFR BLD AUTO: 2 % (ref 0–6)
ERYTHROCYTE [DISTWIDTH] IN BLOOD BY AUTOMATED COUNT: 14.6 % (ref 11.6–15.1)
GFR SERPL CREATININE-BSD FRML MDRD: 60 ML/MIN/1.73SQ M
GLUCOSE SERPL-MCNC: 112 MG/DL (ref 65–140)
HCT VFR BLD AUTO: 40.7 % (ref 34.8–46.1)
HGB BLD-MCNC: 12.6 G/DL (ref 11.5–15.4)
IMM GRANULOCYTES # BLD AUTO: 0.03 THOUSAND/UL (ref 0–0.2)
IMM GRANULOCYTES NFR BLD AUTO: 0 % (ref 0–2)
LYMPHOCYTES # BLD AUTO: 1.57 THOUSANDS/ΜL (ref 0.6–4.47)
LYMPHOCYTES NFR BLD AUTO: 20 % (ref 14–44)
MAGNESIUM SERPL-MCNC: 2.1 MG/DL (ref 1.9–2.7)
MCH RBC QN AUTO: 27.5 PG (ref 26.8–34.3)
MCHC RBC AUTO-ENTMCNC: 31 G/DL (ref 31.4–37.4)
MCV RBC AUTO: 89 FL (ref 82–98)
MONOCYTES # BLD AUTO: 0.67 THOUSAND/ΜL (ref 0.17–1.22)
MONOCYTES NFR BLD AUTO: 8 % (ref 4–12)
NEUTROPHILS # BLD AUTO: 5.5 THOUSANDS/ΜL (ref 1.85–7.62)
NEUTS SEG NFR BLD AUTO: 69 % (ref 43–75)
NRBC BLD AUTO-RTO: 0 /100 WBCS
PLATELET # BLD AUTO: 264 THOUSANDS/UL (ref 149–390)
PMV BLD AUTO: 10.8 FL (ref 8.9–12.7)
POTASSIUM SERPL-SCNC: 3.5 MMOL/L (ref 3.5–5.3)
RBC # BLD AUTO: 4.58 MILLION/UL (ref 3.81–5.12)
SODIUM SERPL-SCNC: 138 MMOL/L (ref 135–147)
WBC # BLD AUTO: 7.96 THOUSAND/UL (ref 4.31–10.16)

## 2024-10-17 PROCEDURE — 93005 ELECTROCARDIOGRAM TRACING: CPT

## 2024-10-17 PROCEDURE — 36415 COLL VENOUS BLD VENIPUNCTURE: CPT | Performed by: STUDENT IN AN ORGANIZED HEALTH CARE EDUCATION/TRAINING PROGRAM

## 2024-10-17 PROCEDURE — 85025 COMPLETE CBC W/AUTO DIFF WBC: CPT | Performed by: STUDENT IN AN ORGANIZED HEALTH CARE EDUCATION/TRAINING PROGRAM

## 2024-10-17 PROCEDURE — 73502 X-RAY EXAM HIP UNI 2-3 VIEWS: CPT

## 2024-10-17 PROCEDURE — 96375 TX/PRO/DX INJ NEW DRUG ADDON: CPT

## 2024-10-17 PROCEDURE — 96374 THER/PROPH/DIAG INJ IV PUSH: CPT

## 2024-10-17 PROCEDURE — 83735 ASSAY OF MAGNESIUM: CPT | Performed by: STUDENT IN AN ORGANIZED HEALTH CARE EDUCATION/TRAINING PROGRAM

## 2024-10-17 PROCEDURE — 99285 EMERGENCY DEPT VISIT HI MDM: CPT

## 2024-10-17 PROCEDURE — 99285 EMERGENCY DEPT VISIT HI MDM: CPT | Performed by: STUDENT IN AN ORGANIZED HEALTH CARE EDUCATION/TRAINING PROGRAM

## 2024-10-17 PROCEDURE — 80048 BASIC METABOLIC PNL TOTAL CA: CPT | Performed by: STUDENT IN AN ORGANIZED HEALTH CARE EDUCATION/TRAINING PROGRAM

## 2024-10-17 RX ORDER — METOPROLOL TARTRATE 1 MG/ML
5 INJECTION, SOLUTION INTRAVENOUS ONCE
Status: COMPLETED | OUTPATIENT
Start: 2024-10-17 | End: 2024-10-17

## 2024-10-17 RX ORDER — DILTIAZEM HYDROCHLORIDE 5 MG/ML
15 INJECTION INTRAVENOUS ONCE
Status: COMPLETED | OUTPATIENT
Start: 2024-10-17 | End: 2024-10-17

## 2024-10-17 RX ORDER — MORPHINE SULFATE 4 MG/ML
4 INJECTION, SOLUTION INTRAMUSCULAR; INTRAVENOUS ONCE
Status: COMPLETED | OUTPATIENT
Start: 2024-10-17 | End: 2024-10-17

## 2024-10-17 RX ADMIN — METOPROLOL TARTRATE 5 MG: 5 INJECTION INTRAVENOUS at 13:05

## 2024-10-17 RX ADMIN — DILTIAZEM HYDROCHLORIDE 15 MG: 5 INJECTION INTRAVENOUS at 14:23

## 2024-10-17 RX ADMIN — MORPHINE SULFATE 4 MG: 4 INJECTION INTRAVENOUS at 13:08

## 2024-10-17 NOTE — ED NOTES
Pt ambulated without any complaints. Ambulated well. Dr calixto aware     Jazmine Reddy  10/17/24 8006

## 2024-10-17 NOTE — DISCHARGE INSTRUCTIONS
The right hip x-ray did not show any significant abnormalities.  Continue all prescribed medications.  In addition to the tramadol, you can take Tylenol 1000 mg every 6 hours.    You were administered a dose of IV diltiazem and lopressor which improved your heart rate.  Follow-up with your primary care provider and cardiologist tomorrow regarding your metoprolol dose.    Return to the emergency department for any concerning signs or symptoms.

## 2024-10-17 NOTE — ED PROVIDER NOTES
Time reflects when diagnosis was documented in both MDM as applicable and the Disposition within this note       Time User Action Codes Description Comment    10/17/2024  3:39 PM Raulito Zhong [M25.551] Right hip pain     10/17/2024  3:39 PM Raulito Zhong [I48.91] Atrial fibrillation with RVR (HCC)           ED Disposition       ED Disposition   Discharge    Condition   Stable    Date/Time   u Oct 17, 2024  3:39 PM    Comment   Dian Kyung discharge to home/self care.                   Assessment & Plan       Medical Decision Making  This patient presents with right hip pain, palpitations.   Diagnostic considerations include osteoarthritis, hip fracture, UTI, ureteral calculus, electrolyte abnormality.    1:01 PM  Vital signs reviewed.  Tachycardic upon arrival.  Atrial fibrillation with RVR.  History of atrial fibrillation on metoprolol twice daily, Eliquis.  The patient states that she has been having worsening right hip pain for approximately 3 months.  Acutely worsening over the last 3 days.  Denies known injury.  Takes tramadol 3 times daily.  On exam, the patient has tenderness along the right groin/right lateral hip.  No bruising.  Will obtain right hip x-ray, labs. Will treat pain with IV morphine and administer a dose of IV lopressor.     1:26 PM  Status post IV Lopressor 5 mg.  Heart rate ranging between 90 and 105 bpm.    2:06 PM  Repeat heart rate ranging between 120 and 130 bpm.  Will administer a dose of IV diltiazem.     3:27 PM  Status post IV diltiazem 15 mg.  No recurrence of atrial fibrillation RVR.  The patient denies palpitations.  The patient was able to ambulate independently with stable gait.  The patient was encouraged to follow-up with her PCP/cardiologist tomorrow and to continue all prescribed medications.  Other recommendation/return precautions discussed.  All questions addressed.  Stable for discharge.                Problems Addressed:  Atrial fibrillation with RVR  (HCC): chronic illness or injury with exacerbation, progression, or side effects of treatment  Right hip pain: chronic illness or injury    Amount and/or Complexity of Data Reviewed  Labs: ordered.     Details: No significant laboratory abnormalities.  Radiology: ordered and independent interpretation performed.     Details: No acute findings.  ECG/medicine tests: ordered and independent interpretation performed.     Details: Atrial flutter with variable AV block. HR 77 bpm. Leftward axis. No acute ischemic changes.   Discussion of management or test interpretation with external provider(s): The case and treatment plan were discussed with Cardiology.     Risk  Prescription drug management.      Medications   metoprolol (LOPRESSOR) injection 5 mg (5 mg Intravenous Given 10/17/24 1305)   morphine injection 4 mg (4 mg Intravenous Given 10/17/24 1308)   diltiazem (CARDIZEM) injection 15 mg (15 mg Intravenous Given 10/17/24 1423)     ED Risk Strat Scores                           SBIRT 22yo+      Flowsheet Row Most Recent Value   Initial Alcohol Screen: US AUDIT-C     1. How often do you have a drink containing alcohol? 0 Filed at: 10/17/2024 1223   2. How many drinks containing alcohol do you have on a typical day you are drinking?  0 Filed at: 10/17/2024 1223   3b. FEMALE Any Age, or MALE 65+: How often do you have 4 or more drinks on one occassion? 0 Filed at: 10/17/2024 1223   Audit-C Score 0 Filed at: 10/17/2024 1223   JELLY: How many times in the past year have you...    Used an illegal drug or used a prescription medication for non-medical reasons? Never Filed at: 10/17/2024 1223                            History of Present Illness       Chief Complaint   Patient presents with    Hip Pain     Patient presents to the ED with complaints of right hip and leg pain that has been intermittent over the past three months. Upon arrival, patient is tachycardic with a history of afib.        Past Medical History:   Diagnosis  Date    Aneurysm (HCC)     Followed by Dr. Hutchison in [place].  CTA  April 2018 Stable left C6 aneurysm    Arthritis     Atrial fibrillation (HCC)     Insomnia     Has been taking trazodone for sleep for at least a decade.  No side effects.  She can't sleep without the trazodone.    Multinodular goiter 2016    Throid US confirmed multinodular goiter.    Stroke (HCC)       Past Surgical History:   Procedure Laterality Date    APPENDECTOMY      BUNIONECTOMY      CATARACT EXTRACTION W/ INTRAOCULAR LENS  IMPLANT, BILATERAL      TOTAL HIP ARTHROPLASTY Right 03/17/2023    Broken femur with right hip replacement    TOTAL VAGINAL HYSTERECTOMY        Family History   Problem Relation Age of Onset    Cancer Mother     Stroke Mother     Uterine cancer Mother     No Known Problems Father     No Known Problems Maternal Grandmother     No Known Problems Maternal Grandfather     No Known Problems Paternal Grandmother     No Known Problems Paternal Grandfather     No Known Problems Maternal Aunt     No Known Problems Son     BRCA2 Positive Neg Hx     BRCA2 Negative Neg Hx     BRCA1 Negative Neg Hx     BRCA1 Positive Neg Hx     BRCA 1/2 Neg Hx     Ovarian cancer Neg Hx     Endometrial cancer Neg Hx     Colon cancer Neg Hx     Breast cancer additional onset Neg Hx     Breast cancer Neg Hx       Social History     Tobacco Use    Smoking status: Never    Smokeless tobacco: Never   Vaping Use    Vaping status: Never Used   Substance Use Topics    Alcohol use: No    Drug use: No      E-Cigarette/Vaping    E-Cigarette Use Never User       E-Cigarette/Vaping Substances      I have reviewed and agree with the history as documented.       History provided by:  Medical records  Hip Pain  Location:  Right groin pain, right lateral hip  Severity:  Moderate  Onset quality:  Gradual  Duration:  3 months  Timing:  Intermittent  Progression:  Worsening  Chronicity:  New  Context:  Worsening right hip pain x 3 months. Improves with Tramadol. Denies  injury.  Relieved by:  Tramadol  Worsened by:  Moving from a sitting to standing position  Associated symptoms: no abdominal pain, no chest pain, no diarrhea, no fatigue, no nausea, no shortness of breath and no wheezing      Review of Systems   Constitutional:  Negative for activity change, appetite change, chills and fatigue.   Respiratory:  Negative for chest tightness, shortness of breath and wheezing.    Cardiovascular:  Positive for palpitations. Negative for chest pain.   Gastrointestinal:  Negative for abdominal pain, diarrhea and nausea.   Musculoskeletal:  Positive for arthralgias and gait problem. Negative for back pain and joint swelling.   Skin:  Negative for color change, pallor and wound.   Neurological:  Negative for dizziness and light-headedness.   All other systems reviewed and are negative.    Objective       ED Triage Vitals [10/17/24 1225]   Temperature Pulse Blood Pressure Respirations SpO2 Patient Position - Orthostatic VS   (!) 97.3 °F (36.3 °C) (!) 141 117/92 18 95 % Sitting      Temp Source Heart Rate Source BP Location FiO2 (%) Pain Score    Temporal Monitor Left arm -- 8      Vitals      Date and Time Temp Pulse SpO2 Resp BP Pain Score FACES Pain Rating User   10/17/24 1545 -- 76 99 % 16 128/65 No Pain -- SBE   10/17/24 1519 -- 77 95 % 17 137/92 -- -- HK   10/17/24 1445 -- 79 95 % 18 -- -- --    10/17/24 1315 -- 133 96 % 18 107/72 -- --    10/17/24 1310 -- 121 97 % 18 103/70 -- -- NB   10/17/24 1308 -- -- -- -- -- 8 -- NB   10/17/24 1300 -- 137 97 % 21 107/66 -- -- NB   10/17/24 1225 97.3 °F (36.3 °C) 141 95 % 18 117/92 8 -- RR            Physical Exam  Vitals and nursing note reviewed.   Constitutional:       General: She is not in acute distress.     Appearance: She is not ill-appearing or toxic-appearing.   HENT:      Head: Normocephalic and atraumatic.      Right Ear: External ear normal.      Left Ear: External ear normal.      Nose: No congestion or rhinorrhea.   Eyes:       General: No scleral icterus.        Right eye: No discharge.         Left eye: No discharge.      Extraocular Movements: Extraocular movements intact.      Conjunctiva/sclera: Conjunctivae normal.   Cardiovascular:      Rate and Rhythm: Tachycardia present. Rhythm irregular.      Pulses: Normal pulses.      Heart sounds: Normal heart sounds. No murmur heard.  Pulmonary:      Effort: Pulmonary effort is normal. No respiratory distress.      Breath sounds: Normal breath sounds. No wheezing or rhonchi.   Chest:      Chest wall: No tenderness.   Abdominal:      General: Bowel sounds are normal.      Palpations: Abdomen is soft.      Tenderness: There is no abdominal tenderness. There is no right CVA tenderness, left CVA tenderness, guarding or rebound.   Musculoskeletal:         General: Tenderness present. No swelling, deformity or signs of injury.      Comments: Tenderness to palpation along the right groin/lateral hip.  No signs of close deformity.  No overlying skin changes.  No bruising.   Skin:     General: Skin is warm and dry.      Coloration: Skin is not jaundiced.      Findings: No bruising or erythema.   Neurological:      General: No focal deficit present.      Mental Status: She is alert and oriented to person, place, and time.   Psychiatric:         Mood and Affect: Mood normal.         Behavior: Behavior normal.       Results Reviewed       Procedure Component Value Units Date/Time    Basic metabolic panel [[id number]] Collected: 10/17/24 1305    Lab Status: Final result Specimen: Blood from Arm, Right Updated: 10/17/24 1336     Sodium 138 mmol/L      Potassium 3.5 mmol/L      Chloride 102 mmol/L      CO2 29 mmol/L      ANION GAP 7 mmol/L      BUN 20 mg/dL      Creatinine 0.89 mg/dL      Glucose 112 mg/dL      Calcium 8.6 mg/dL      eGFR 60 ml/min/1.73sq m     Narrative:      National Kidney Disease Foundation guidelines for Chronic Kidney Disease (CKD):     Stage 1 with normal or high GFR (GFR > 90  mL/min/1.73 square meters)    Stage 2 Mild CKD (GFR = 60-89 mL/min/1.73 square meters)    Stage 3A Moderate CKD (GFR = 45-59 mL/min/1.73 square meters)    Stage 3B Moderate CKD (GFR = 30-44 mL/min/1.73 square meters)    Stage 4 Severe CKD (GFR = 15-29 mL/min/1.73 square meters)    Stage 5 End Stage CKD (GFR <15 mL/min/1.73 square meters)  Note: GFR calculation is accurate only with a steady state creatinine    Magnesium [[id number]]  (Normal) Collected: 10/17/24 1305    Lab Status: Final result Specimen: Blood from Arm, Right Updated: 10/17/24 1336     Magnesium 2.1 mg/dL     CBC and differential [[id number]]  (Abnormal) Collected: 10/17/24 1305    Lab Status: Final result Specimen: Blood from Arm, Right Updated: 10/17/24 1317     WBC 7.96 Thousand/uL      RBC 4.58 Million/uL      Hemoglobin 12.6 g/dL      Hematocrit 40.7 %      MCV 89 fL      MCH 27.5 pg      MCHC 31.0 g/dL      RDW 14.6 %      MPV 10.8 fL      Platelets 264 Thousands/uL      nRBC 0 /100 WBCs      Segmented % 69 %      Immature Grans % 0 %      Lymphocytes % 20 %      Monocytes % 8 %      Eosinophils Relative 2 %      Basophils Relative 1 %      Absolute Neutrophils 5.50 Thousands/µL      Absolute Immature Grans 0.03 Thousand/uL      Absolute Lymphocytes 1.57 Thousands/µL      Absolute Monocytes 0.67 Thousand/µL      Eosinophils Absolute 0.14 Thousand/µL      Basophils Absolute 0.05 Thousands/µL           XR hip/pelv 2-3 vws right if performed   ED Interpretation by Raulito Zhong DO (10/17 0764)   Status post right hip replacement.  Hardware appears intact.  No acute findings.      Final Interpretation by Tyler Giraldo MD (10/17 4434)      Right hip hemiarthroplasty without evidence of a hardware complication or failure.      Degenerative changes of the spine and left hip.         Computerized Assisted Algorithm (CAA) may have been used to analyze all applicable images.               Resident: Steve Braswell I, the attending radiologist, have  reviewed the images and agree with the final report above.      Workstation performed: [id number]           Procedures    ED Medication and Procedure Management   Prior to Admission Medications   Prescriptions Last Dose Informant Patient Reported? Taking?   Calcium Carbonate 1500 (600 Ca) MG TABS   Yes No   Si,200 mg   Eliquis 5 MG   No No   Sig: take 1 tablet by mouth twice a day   Multiple Vitamins-Minerals (MULTIVITAMIN ADULT PO)   Yes No   Sig: Take 1 tablet by mouth daily   amLODIPine (NORVASC) 5 mg tablet   Yes No   Sig: Take 5 mg by mouth daily   atorvastatin (LIPITOR) 40 mg tablet   No No   Sig: Take 1 tablet (40 mg total) by mouth daily   betamethasone dipropionate (DIPROSONE) 0.05 % cream   No No   Sig: Apply topically 2 (two) times a day as needed for rash   denosumab (Prolia) 60 mg/mL   Yes No   Sig: Inject 60 mg under the skin once   diltiazem (Cardizem CD) 120 mg 24 hr capsule   Yes No   Sig: Take 120 mg by mouth daily   furosemide (LASIX) 40 mg tablet   No No   Sig: take 1 tablet by mouth once daily if needed FOR EDEMA   metoprolol tartrate (LOPRESSOR) 100 mg tablet   No No   Sig: Take 0.5 tablets (50 mg total) by mouth every 12 (twelve) hours   rOPINIRole (REQUIP) 0.5 mg tablet   No No   Sig: take 1 tablet by mouth three times a day   traMADol (ULTRAM) 50 mg tablet   Yes No   Sig: Take 0.5 tablets (25 mg total) by mouth 3 (three) times a day   traZODone (DESYREL) 50 mg tablet   No No   Sig: take 1 tablet by mouth at bedtime      Facility-Administered Medications: None     Discharge Medication List as of 10/17/2024  3:41 PM        CONTINUE these medications which have NOT CHANGED    Details   amLODIPine (NORVASC) 5 mg tablet Take 5 mg by mouth daily, Starting Mon 2024, Historical Med      atorvastatin (LIPITOR) 40 mg tablet Take 1 tablet (40 mg total) by mouth daily, Starting Tu2024, Fill Later      betamethasone dipropionate (DIPROSONE) 0.05 % cream Apply topically 2 (two) times a  day as needed for rash, Starting Tue 4/9/2024, Fill Later      Calcium Carbonate 1500 (600 Ca) MG TABS 1,200 mg, Starting Mon 6/7/2021, Historical Med      denosumab (Prolia) 60 mg/mL Inject 60 mg under the skin once, Starting Mon 8/14/2023, Historical Med      diltiazem (Cardizem CD) 120 mg 24 hr capsule Take 120 mg by mouth daily, Starting Fri 4/5/2024, Until Sat 8/3/2024, Historical Med      Eliquis 5 MG take 1 tablet by mouth twice a day, Normal      furosemide (LASIX) 40 mg tablet take 1 tablet by mouth once daily if needed FOR EDEMA, Starting Thu 9/19/2024, Normal      metoprolol tartrate (LOPRESSOR) 100 mg tablet Take 0.5 tablets (50 mg total) by mouth every 12 (twelve) hours, Starting Mon 7/29/2024, Fill Later      Multiple Vitamins-Minerals (MULTIVITAMIN ADULT PO) Take 1 tablet by mouth daily, Historical Med      rOPINIRole (REQUIP) 0.5 mg tablet take 1 tablet by mouth three times a day, Starting Sat 7/6/2024, Normal      traMADol (ULTRAM) 50 mg tablet Take 0.5 tablets (25 mg total) by mouth 3 (three) times a day, Starting Tue 4/9/2024, Historical Med      traZODone (DESYREL) 50 mg tablet take 1 tablet by mouth at bedtime, Normal           No discharge procedures on file.  ED SEPSIS DOCUMENTATION   Time reflects when diagnosis was documented in both MDM as applicable and the Disposition within this note       Time User Action Codes Description Comment    10/17/2024  3:39 PM Raulito Zhong [M25.551] Right hip pain     10/17/2024  3:39 PM Raulito Zhong [I48.91] Atrial fibrillation with RVR (formerly [facility])                  Raulito Zhong DO  10/18/24 0828

## 2024-10-18 LAB
ATRIAL RATE: 326 BPM
QRS AXIS: -38 DEGREES
QRSD INTERVAL: 68 MS
QT INTERVAL: 424 MS
QTC INTERVAL: 479 MS
T WAVE AXIS: -14 DEGREES
VENTRICULAR RATE: 77 BPM

## 2024-10-21 ENCOUNTER — OFFICE VISIT (OUTPATIENT)
Dept: FAMILY MEDICINE CLINIC | Facility: CLINIC | Age: 79
End: 2024-10-21
Payer: COMMERCIAL

## 2024-10-21 VITALS
OXYGEN SATURATION: 98 % | HEART RATE: 70 BPM | HEIGHT: 64 IN | WEIGHT: 146 LBS | BODY MASS INDEX: 24.92 KG/M2 | SYSTOLIC BLOOD PRESSURE: 124 MMHG | DIASTOLIC BLOOD PRESSURE: 68 MMHG

## 2024-10-21 DIAGNOSIS — M25.50 MULTIPLE JOINT PAIN: Primary | ICD-10-CM

## 2024-10-21 DIAGNOSIS — Z23 ENCOUNTER FOR IMMUNIZATION: ICD-10-CM

## 2024-10-21 PROCEDURE — 99214 OFFICE O/P EST MOD 30 MIN: CPT | Performed by: INTERNAL MEDICINE

## 2024-10-21 PROCEDURE — G0008 ADMIN INFLUENZA VIRUS VAC: HCPCS | Performed by: INTERNAL MEDICINE

## 2024-10-21 PROCEDURE — 90662 IIV NO PRSV INCREASED AG IM: CPT | Performed by: INTERNAL MEDICINE

## 2024-10-21 RX ORDER — TRAMADOL HYDROCHLORIDE 50 MG/1
50 TABLET ORAL EVERY 6 HOURS PRN
Qty: 120 TABLET | Refills: 0 | Status: SHIPPED | OUTPATIENT
Start: 2024-10-21

## 2024-10-21 RX ORDER — AMIODARONE HYDROCHLORIDE 200 MG/1
1 TABLET ORAL DAILY
COMMUNITY
Start: 2024-10-15

## 2024-10-21 NOTE — PROGRESS NOTES
Ambulatory Visit  Name: Dian Tracey      : 1945      MRN: [id number]  Encounter Provider: Fransisco Duncan MD  Encounter Date: 10/21/2024   Encounter department: [facility]       Assessment & Plan  1. Right hip pain.  Her x-ray results were normal, with all hardware in place. The cause of her pain remains unclear, but it could potentially be related to her lower back or a pinched nerve. She was advised that her Eliquis medication limits the use of ibuprofen or Aleve due to increased bleeding risk. Morphine was previously administered and seemed effective, but she has expressed a preference against younger opiates. A prescription for tramadol 50 mg four times daily will be provided. She is advised to take it three times daily, but in case of severe pain, she can take two tablets. If this regimen proves beneficial, she can revert to taking one tablet. A total of 120 tablets will be prescribed and sent to [facility].    2. Atrial fibrillation.  Her heart rate is within normal range, though it sounds slightly irregular, indicating she may be going in and out of atrial fibrillation. No changes will be made to her current regimen. She will continue with diltiazem and Eliquis.              History of Present Illness       History of Present Illness  The patient is an 79-year-old female who presented to the [facility] emergency department on 10/17/2023 with complaints of right hip pain and palpitations.    Upon arrival, she was found to be in atrial fibrillation with a rapid ventricular response. She was administered IV morphine for her pain and a dose of intravenous Lopressor, which reduced her heart rate to between 90 and 105. However, she became tachycardic again and received a dose of IV diltiazem, which resolved her atrial fibrillation. She was discharged home later that day.    She experiences hip pain every 2 to 3 weeks, which radiates to her right groin and  "thighs. The pain was particularly severe last week to the point where she was unable to lift her leg to get into bed. The pain typically lasts for 3 to 4 days, during which she remains inactive, and then it subsides by the next morning. She takes tramadol three times a day for back pain, but it does not alleviate her hip pain. She is considering whether her pain could be related to her lower disc or a pinched nerve.  She still has some tramadol left. She also takes tramadol for her arthritis and back pain, which she finds helpful. She is curious if tramadol could affect her heart.        Objective     /68 (BP Location: Right arm, Patient Position: Sitting, Cuff Size: Large)   Pulse 70   Ht 5' 4\" (1.626 m)   Wt 66.2 kg (146 lb)   SpO2 98%   BMI 25.06 kg/m²       Physical Exam  Constitutional:       General: She is not in acute distress.     Appearance: Normal appearance. She is well-developed. She is not ill-appearing.   Neck:      Vascular: No JVD.   Cardiovascular:      Rate and Rhythm: Normal rate. Rhythm irregular.      Heart sounds: Normal heart sounds. No murmur heard.     No friction rub. No gallop.   Pulmonary:      Breath sounds: Normal breath sounds.   Abdominal:      General: Bowel sounds are normal. There is no distension.      Palpations: Abdomen is soft. There is no mass.   Musculoskeletal:         General: No swelling.      Comments: Right hip flexion was limited to about 90 degrees, she has significant discomfort with internal and external rotation of the right hip.   Neurological:      Mental Status: She is alert.       Administrative Statements       "

## 2024-10-25 ENCOUNTER — APPOINTMENT (OUTPATIENT)
Dept: LAB | Facility: CLINIC | Age: 79
End: 2024-10-25
Payer: COMMERCIAL

## 2024-10-25 DIAGNOSIS — I48.19 PERSISTENT ATRIAL FIBRILLATION WITH RVR (HCC): ICD-10-CM

## 2024-10-25 LAB
ALBUMIN SERPL BCG-MCNC: 3.7 G/DL (ref 3.5–5)
ALP SERPL-CCNC: 83 U/L (ref 34–104)
ALT SERPL W P-5'-P-CCNC: 13 U/L (ref 7–52)
ANION GAP SERPL CALCULATED.3IONS-SCNC: 8 MMOL/L (ref 4–13)
AST SERPL W P-5'-P-CCNC: 17 U/L (ref 13–39)
BASOPHILS # BLD AUTO: 0.05 THOUSANDS/ΜL (ref 0–0.1)
BASOPHILS NFR BLD AUTO: 1 % (ref 0–1)
BILIRUB DIRECT SERPL-MCNC: 0.19 MG/DL (ref 0–0.2)
BILIRUB SERPL-MCNC: 0.64 MG/DL (ref 0.2–1)
BUN SERPL-MCNC: 19 MG/DL (ref 5–25)
CHLORIDE SERPL-SCNC: 97 MMOL/L (ref 96–108)
CO2 SERPL-SCNC: 35 MMOL/L (ref 21–32)
CREAT SERPL-MCNC: 1.02 MG/DL (ref 0.6–1.3)
EOSINOPHIL # BLD AUTO: 0.24 THOUSAND/ΜL (ref 0–0.61)
EOSINOPHIL NFR BLD AUTO: 3 % (ref 0–6)
ERYTHROCYTE [DISTWIDTH] IN BLOOD BY AUTOMATED COUNT: 14.6 % (ref 11.6–15.1)
GFR SERPL CREATININE-BSD FRML MDRD: 51 ML/MIN/1.73SQ M
HCT VFR BLD AUTO: 42.2 % (ref 34.8–46.1)
HGB BLD-MCNC: 13 G/DL (ref 11.5–15.4)
IMM GRANULOCYTES # BLD AUTO: 0.04 THOUSAND/UL (ref 0–0.2)
IMM GRANULOCYTES NFR BLD AUTO: 0 % (ref 0–2)
LYMPHOCYTES # BLD AUTO: 1.75 THOUSANDS/ΜL (ref 0.6–4.47)
LYMPHOCYTES NFR BLD AUTO: 19 % (ref 14–44)
MAGNESIUM SERPL-MCNC: 2.2 MG/DL (ref 1.9–2.7)
MCH RBC QN AUTO: 28.1 PG (ref 26.8–34.3)
MCHC RBC AUTO-ENTMCNC: 30.8 G/DL (ref 31.4–37.4)
MCV RBC AUTO: 91 FL (ref 82–98)
MONOCYTES # BLD AUTO: 0.64 THOUSAND/ΜL (ref 0.17–1.22)
MONOCYTES NFR BLD AUTO: 7 % (ref 4–12)
NEUTROPHILS # BLD AUTO: 6.48 THOUSANDS/ΜL (ref 1.85–7.62)
NEUTS SEG NFR BLD AUTO: 70 % (ref 43–75)
NRBC BLD AUTO-RTO: 0 /100 WBCS
PLATELET # BLD AUTO: 292 THOUSANDS/UL (ref 149–390)
PMV BLD AUTO: 12 FL (ref 8.9–12.7)
POTASSIUM SERPL-SCNC: 4.1 MMOL/L (ref 3.5–5.3)
PROT SERPL-MCNC: 7 G/DL (ref 6.4–8.4)
RBC # BLD AUTO: 4.63 MILLION/UL (ref 3.81–5.12)
SODIUM SERPL-SCNC: 140 MMOL/L (ref 135–147)
TSH SERPL DL<=0.05 MIU/L-ACNC: 2.62 UIU/ML (ref 0.45–4.5)
WBC # BLD AUTO: 9.2 THOUSAND/UL (ref 4.31–10.16)

## 2024-10-25 PROCEDURE — 36415 COLL VENOUS BLD VENIPUNCTURE: CPT

## 2024-10-25 PROCEDURE — 80051 ELECTROLYTE PANEL: CPT

## 2024-10-25 PROCEDURE — 82565 ASSAY OF CREATININE: CPT

## 2024-10-25 PROCEDURE — 83735 ASSAY OF MAGNESIUM: CPT

## 2024-10-25 PROCEDURE — 80076 HEPATIC FUNCTION PANEL: CPT

## 2024-10-25 PROCEDURE — 85025 COMPLETE CBC W/AUTO DIFF WBC: CPT

## 2024-10-25 PROCEDURE — 84443 ASSAY THYROID STIM HORMONE: CPT

## 2024-10-25 PROCEDURE — 84520 ASSAY OF UREA NITROGEN: CPT

## 2024-11-10 DIAGNOSIS — G47.9 DIFFICULTY SLEEPING: ICD-10-CM

## 2024-11-10 DIAGNOSIS — G25.81 RESTLESS LEGS SYNDROME: ICD-10-CM

## 2024-11-11 RX ORDER — ROPINIROLE 0.5 MG/1
0.5 TABLET, FILM COATED ORAL 3 TIMES DAILY
Qty: 180 TABLET | Refills: 1 | Status: SHIPPED | OUTPATIENT
Start: 2024-11-11

## 2024-11-14 ENCOUNTER — RESULTS FOLLOW-UP (OUTPATIENT)
Dept: FAMILY MEDICINE CLINIC | Facility: CLINIC | Age: 79
End: 2024-11-14

## 2024-11-15 ENCOUNTER — TRANSITIONAL CARE MANAGEMENT (OUTPATIENT)
Dept: FAMILY MEDICINE CLINIC | Facility: CLINIC | Age: 79
End: 2024-11-15

## 2024-11-18 ENCOUNTER — OFFICE VISIT (OUTPATIENT)
Dept: FAMILY MEDICINE CLINIC | Facility: CLINIC | Age: 79
End: 2024-11-18
Payer: COMMERCIAL

## 2024-11-18 VITALS
WEIGHT: 142.6 LBS | SYSTOLIC BLOOD PRESSURE: 116 MMHG | OXYGEN SATURATION: 99 % | HEART RATE: 83 BPM | DIASTOLIC BLOOD PRESSURE: 70 MMHG | BODY MASS INDEX: 24.34 KG/M2 | HEIGHT: 64 IN

## 2024-11-18 DIAGNOSIS — F11.90 OPIOID USE: ICD-10-CM

## 2024-11-18 DIAGNOSIS — L30.9 DERMATITIS: ICD-10-CM

## 2024-11-18 DIAGNOSIS — I48.0 PAROXYSMAL ATRIAL FIBRILLATION (HCC): Primary | ICD-10-CM

## 2024-11-18 PROCEDURE — 99496 TRANSJ CARE MGMT HIGH F2F 7D: CPT | Performed by: INTERNAL MEDICINE

## 2024-11-18 RX ORDER — BETAMETHASONE DIPROPIONATE 0.5 MG/G
CREAM TOPICAL 2 TIMES DAILY PRN
Qty: 45 G | Refills: 0 | Status: SHIPPED | OUTPATIENT
Start: 2024-11-18

## 2024-11-18 RX ORDER — DILTIAZEM HYDROCHLORIDE 120 MG/1
120 CAPSULE, COATED, EXTENDED RELEASE ORAL DAILY
Qty: 90 CAPSULE | Refills: 1 | Status: SHIPPED | OUTPATIENT
Start: 2024-11-18 | End: 2025-05-17

## 2024-11-18 RX ORDER — DILTIAZEM HYDROCHLORIDE 30 MG/1
1 TABLET, FILM COATED ORAL 4 TIMES DAILY
COMMUNITY
Start: 2024-11-15 | End: 2024-11-18

## 2024-11-18 RX ORDER — METOPROLOL TARTRATE 100 MG/1
100 TABLET ORAL EVERY 12 HOURS SCHEDULED
Qty: 60 TABLET | Refills: 3 | Status: SHIPPED | OUTPATIENT
Start: 2024-11-18

## 2024-11-18 NOTE — ASSESSMENT & PLAN NOTE
Orders:    diltiazem (Cardizem CD) 120 mg 24 hr capsule; Take 1 capsule (120 mg total) by mouth daily

## 2024-11-18 NOTE — ASSESSMENT & PLAN NOTE
Orders:    betamethasone dipropionate (DIPROSONE) 0.05 % cream; Apply topically 2 (two) times a day as needed for rash

## 2024-11-18 NOTE — PROGRESS NOTES
Transition of Care Visit  Name: Dian Tracey      : 1945      MRN: [id number]  Encounter Provider: Fransisco Duncan MD  Encounter Date: 2024   Encounter department: [facility]  Assessment & Plan  1. Acute Hemorrhagic Cystitis.  The patient was diagnosed with acute hemorrhagic cystitis after presenting with blood in the urine. She was treated with Rocephin 1 g IV every 24 hours for a suspected urinary tract infection, although the urine culture showed mixed bacteria, making a UTI less likely. A follow-up with a urologist is recommended to determine if a repeat cystoscopy is necessary.    2. Atrial Fibrillation.  She will continue taking Eliquis 5 mg twice a day to prevent strokes. She will also continue with amiodarone 200 mg once a day and metoprolol 100 mg twice a day. A switch back to long-acting diltiazem 120 mg once a day is planned. She will follow up with cardiology for ongoing management.    3. Constipation.  She reported a history of constipation, which was also addressed during her recent hospitalization. No new treatment plan was discussed during this visit.    4. Medication Management.  She will continue taking trazodone 50 mg at bedtime for sleep and tramadol for arthritis pain. She requested a new prescription for betamethasone cream due to itching. A prescription for long-acting diltiazem 120 mg once a day will be sent to her pharmacy, [facility] in [place].     History of Present Illness     Transitional Care Management Review:   Dian Tracey is a 79 y.o. female here for TCM follow up.     During the TCM phone call patient stated:  TCM Call       Date and time call was made  11/15/2024  2:02 PM    Hospital care reviewed  Records reviewed    Patient was hospitialized at  Other (comment)    Comment  [facility]    Date of Admission  24    Date of discharge  24    Disposition  Home    Were the patients medications reviewed and updated  Yes     Current Symptoms  None          TCM Call       Post hospital issues  None    Should patient be enrolled in anticoag monitoring?  No    Scheduled for follow up?  Yes    Did you obtain your prescribed medications  Yes    Do you need help managing your prescriptions or medications  No    Is transportation to your appointment needed  No    I have advised the patient to call PCP with any new or worsening symptoms  debbie pina    Living Arrangements  Family members    Support System  Family; Friends    The type of support provided  Emotional; Financial; Physical    Do you have social support  Yes, as much as I need          History of Present Illness  The patient presents for a transition of care management follow-up.    She reports experiencing hematuria, or blood in her urine, which was initially noticed in the toilet and subsequently on her sanitary pads. She sought emergency care at [facility] on 11/11/2024, where she was diagnosed with acute hemorrhagic cystitis and admitted for a three-day stay. During this time, her Eliquis medication was temporarily discontinued due to its potential contribution to the bleeding. She was treated with Rocephin IV 1 g every 24 hours for a suspected urinary tract infection. A urine culture was performed, revealing a mix of different bacteria, leading to the conclusion that a urinary tract infection was unlikely. Since her recent hospitalization, she has not noticed any further blood in her urine, but did experience pain during urination until yesterday.    A urologist was consulted during her hospital stay, but she is unsure if any additional treatment was provided. She has a history of similar symptoms, having been hospitalized for bleeding last year. A cystoscopy performed in 05/2024 by Dr. Mcnair showed no abnormalities in her bladder.    She also reports a history of constipation, which was addressed during her hospital stay. She was prescribed cefdinir 300 mg twice  "daily, which she discontinued on 11/15/2024. She was also prescribed diltiazem 30 mg four times daily, but she stopped taking it yesterday due to distrust in the hospital's care.    She is currently taking amiodarone 200 mg daily, trazodone 50 mg at bedtime, tramadol for arthritis, Eliquis 5 mg twice daily, furosemide as needed for leg swelling, metoprolol 100 mg twice daily, multivitamins, ropinirole three times daily, and atorvastatin. She also uses betamethasone cream for itching.    She recalls an incident during her hospital stay where she lost consciousness after using the bathroom, but she was not on a heart monitor at the time.     Objective   /70 (BP Location: Right arm, Patient Position: Sitting, Cuff Size: Standard)   Pulse 83   Ht 5' 4\" (1.626 m)   Wt 64.7 kg (142 lb 9.6 oz)   SpO2 99%   BMI 24.48 kg/m²     Physical Exam  Constitutional:       General: She is not in acute distress.     Appearance: Normal appearance. She is well-developed. She is not ill-appearing.   Neck:      Vascular: No JVD.   Cardiovascular:      Rate and Rhythm: Normal rate and regular rhythm.      Heart sounds: Normal heart sounds. No murmur heard.     No friction rub. No gallop.   Pulmonary:      Breath sounds: Normal breath sounds.   Abdominal:      General: Bowel sounds are normal. There is no distension.      Palpations: Abdomen is soft. There is no mass.   Musculoskeletal:         General: No swelling.   Neurological:      Mental Status: She is alert.       Medications have been reviewed by provider in current encounter      "

## 2024-12-02 ENCOUNTER — TELEPHONE (OUTPATIENT)
Age: 79
End: 2024-12-02

## 2024-12-02 NOTE — TELEPHONE ENCOUNTER
Pt called to reschedule her 12/3/24 follow up appointment with  due to a schedule conflict with cardiology appointments. Pt rescheduled for 12/31/24 with .

## 2024-12-30 PROBLEM — R31.0 GROSS HEMATURIA: Status: ACTIVE | Noted: 2024-12-30

## 2024-12-31 ENCOUNTER — OFFICE VISIT (OUTPATIENT)
Dept: UROLOGY | Facility: CLINIC | Age: 79
End: 2024-12-31
Payer: COMMERCIAL

## 2024-12-31 VITALS
TEMPERATURE: 98 F | OXYGEN SATURATION: 99 % | SYSTOLIC BLOOD PRESSURE: 130 MMHG | DIASTOLIC BLOOD PRESSURE: 60 MMHG | HEIGHT: 66 IN | BODY MASS INDEX: 22.92 KG/M2 | WEIGHT: 142.6 LBS | HEART RATE: 82 BPM

## 2024-12-31 DIAGNOSIS — R31.0 GROSS HEMATURIA: Primary | ICD-10-CM

## 2024-12-31 LAB
SL AMB  POCT GLUCOSE, UA: ABNORMAL
SL AMB LEUKOCYTE ESTERASE,UA: ABNORMAL
SL AMB POCT BILIRUBIN,UA: ABNORMAL
SL AMB POCT BLOOD,UA: ABNORMAL
SL AMB POCT CLARITY,UA: ABNORMAL
SL AMB POCT COLOR,UA: YELLOW
SL AMB POCT KETONES,UA: ABNORMAL
SL AMB POCT NITRITE,UA: ABNORMAL
SL AMB POCT PH,UA: 6
SL AMB POCT SPECIFIC GRAVITY,UA: >=1.03
SL AMB POCT URINE PROTEIN: ABNORMAL
SL AMB POCT UROBILINOGEN: 0.2

## 2024-12-31 PROCEDURE — 99214 OFFICE O/P EST MOD 30 MIN: CPT | Performed by: UROLOGY

## 2024-12-31 PROCEDURE — 87086 URINE CULTURE/COLONY COUNT: CPT | Performed by: UROLOGY

## 2024-12-31 PROCEDURE — 81003 URINALYSIS AUTO W/O SCOPE: CPT | Performed by: UROLOGY

## 2024-12-31 RX ORDER — PHENAZOPYRIDINE HYDROCHLORIDE 200 MG/1
200 TABLET, FILM COATED ORAL
Qty: 10 TABLET | Refills: 0 | Status: SHIPPED | OUTPATIENT
Start: 2024-12-31

## 2024-12-31 RX ORDER — NITROFURANTOIN 25; 75 MG/1; MG/1
1 CAPSULE ORAL 2 TIMES DAILY
COMMUNITY
Start: 2024-12-12

## 2024-12-31 RX ORDER — DILTIAZEM HYDROCHLORIDE 30 MG/1
1 TABLET, FILM COATED ORAL 4 TIMES DAILY
COMMUNITY
Start: 2024-12-10

## 2024-12-31 NOTE — PROGRESS NOTES
UROLOGY PROGRESS NOTE         NAME: Dian Tracey  AGE: 79 y.o. SEX: female  : 1945   MRN: [id number]    DATE: 2024  TIME: 2:29 PM    Assessment and Plan      Impression:   1. Gross hematuria  -     POCT urine dip auto non-scope  -     CT renal stone study abdomen pelvis wo contrast; Future; Expected date: 2024  -     phenazopyridine (PYRIDIUM) 200 mg tablet; Take 1 tablet (200 mg total) by mouth 3 (three) times a day with meals       Plan: Plan reevaluation due to recurrent issues with gross hematuria likely due to recurrent UTIs however.  Patient was on cephalosporin for UTI recently.  She typically gets dysuria in association with a gross hematuria.  She did have microhematuria today and we sent a repeat culture.  Minimal if any dysuria at this point however . CT and cystoscopy with culture at time scheduled.  We await culture.  Pyridium sent to pharmacy to be used as needed.      Chief Complaint     Chief Complaint   Patient presents with   • Gross Hematuria   • Urinary Incontinence   • Urinary Urgency   • Urinary Frequency   • Urinary Tract Infection       History of Present Illness     HPI: Dian Tracey is a 79 y.o. year old female who presents with prior history of gross hematuria and UTIs.  Previous evaluation did not reveal any other abnormality.  Here for follow-up with UA.  Intermittent issues with hematuria and associated dysuria.  Likely recurrent UTIs based on previous evaluation.  She is also had problems with her A-fib and was told that hematuria is related in part to her Eliquis.  No significant dysuria presently.              The following portions of the patient's history were reviewed and updated as appropriate: allergies, current medications, past family history, past medical history, past social history, past surgical history and problem list.  Past Medical History:   Diagnosis Date   • Acute hemorrhagic cystitis    • Aneurysm (HCC)     Followed by Dr. Hutchison in  [place].  CTA  April 2018 Stable left C6 aneurysm   • Arthritis    • Atrial fibrillation (HCC)    • Constipation    • Gross hematuria    • HTN (hypertension)    • Insomnia     Has been taking trazodone for sleep for at least a decade.  No side effects.  She can't sleep without the trazodone.   • Multinodular goiter 2016    Throid US confirmed multinodular goiter.   • Stroke (HCC)      Past Surgical History:   Procedure Laterality Date   • APPENDECTOMY     • BUNIONECTOMY     • CATARACT EXTRACTION W/ INTRAOCULAR LENS  IMPLANT, BILATERAL     • TOTAL HIP ARTHROPLASTY Right 03/17/2023    Broken femur with right hip replacement   • TOTAL VAGINAL HYSTERECTOMY       shoulder  Review of Systems     Const: Denies chills, fever and weight loss.  CV: Denies chest pain.  Resp: Denies SOB.  GI: Denies abdominal pain, nausea and vomiting.  : Denies symptoms other than stated above.  Musculo: Denies back pain.    Objective   /60   Pulse 82   Temp 98 °F (36.7 °C)   Ht 5' 6\" (1.676 m)   Wt 64.7 kg (142 lb 9.6 oz)   SpO2 99%   BMI 23.02 kg/m²     Physical Exam  Const: Appears healthy and well developed. No signs of acute distress present.  Resp: Respirations are regular and unlabored.   CV: Rate is regular. Rhythm is regular.  Abdomen: Abdomen is soft, nontender, and nondistended. Kidneys are not palpable.  : Not performed  Psych: Patient's attitude is cooperative. Mood is normal. Affect is normal.    Procedure   Procedures     Current Medications     Current Outpatient Medications:   •  amiodarone 200 mg tablet, Take 1 tablet by mouth in the morning, Disp: , Rfl:   •  atorvastatin (LIPITOR) 40 mg tablet, Take 1 tablet (40 mg total) by mouth daily, Disp: 90 tablet, Rfl: 3  •  betamethasone dipropionate (DIPROSONE) 0.05 % cream, Apply topically 2 (two) times a day as needed for rash, Disp: 45 g, Rfl: 0  •  Calcium Carbonate 1500 (600 Ca) MG TABS, Take 1,200 mg by mouth in the morning, Disp: 30 tablet, Rfl: 5  •  " denosumab (Prolia) 60 mg/mL, Inject 60 mg under the skin once, Disp: , Rfl:   •  diltiazem (Cardizem CD) 120 mg 24 hr capsule, Take 1 capsule (120 mg total) by mouth daily, Disp: 90 capsule, Rfl: 1  •  diltiazem (CARDIZEM) 30 mg tablet, Take 1 tablet by mouth 4 times a day, Disp: , Rfl:   •  Eliquis 5 MG, take 1 tablet by mouth twice a day, Disp: 60 tablet, Rfl: 5  •  furosemide (LASIX) 40 mg tablet, take 1 tablet by mouth once daily if needed FOR EDEMA, Disp: 90 tablet, Rfl: 1  •  metoprolol tartrate (LOPRESSOR) 100 mg tablet, Take 1 tablet (100 mg total) by mouth every 12 (twelve) hours, Disp: 60 tablet, Rfl: 3  •  Multiple Vitamins-Minerals (MULTIVITAMIN ADULT PO), Take 1 tablet by mouth daily, Disp: , Rfl:   •  phenazopyridine (PYRIDIUM) 200 mg tablet, Take 1 tablet (200 mg total) by mouth 3 (three) times a day with meals, Disp: 10 tablet, Rfl: 0  •  rOPINIRole (REQUIP) 0.5 mg tablet, take 1 tablet by mouth three times a day, Disp: 180 tablet, Rfl: 1  •  traMADol (ULTRAM) 50 mg tablet, Take 1 tablet (50 mg total) by mouth every 6 (six) hours as needed for moderate pain, Disp: 120 tablet, Rfl: 0  •  traZODone (DESYREL) 50 mg tablet, take 1 tablet by mouth at bedtime, Disp: 30 tablet, Rfl: 5  •  nitrofurantoin (MACROBID) 100 mg capsule, Take 1 capsule by mouth 2 (two) times a day (Patient not taking: Reported on 12/31/2024), Disp: , Rfl:         Davis Mcnair MD

## 2025-01-01 LAB — BACTERIA UR CULT: NORMAL

## 2025-01-06 ENCOUNTER — RESULTS FOLLOW-UP (OUTPATIENT)
Dept: UROLOGY | Facility: CLINIC | Age: 80
End: 2025-01-06

## 2025-01-07 ENCOUNTER — HOSPITAL ENCOUNTER (OUTPATIENT)
Dept: CT IMAGING | Facility: HOSPITAL | Age: 80
Discharge: HOME | End: 2025-01-07
Attending: UROLOGY
Payer: COMMERCIAL

## 2025-01-07 DIAGNOSIS — R31.0 GROSS HEMATURIA: ICD-10-CM

## 2025-01-07 PROCEDURE — 74176 CT ABD & PELVIS W/O CONTRAST: CPT

## 2025-01-07 NOTE — TELEPHONE ENCOUNTER
Called and left message for patient to call the office back regarding below message. Please relay message to patient when she calls the office back.

## 2025-01-07 NOTE — RESULT ENCOUNTER NOTE
Urine culture showed greater than 100,000 colonies of mixed contaminants.  Patient had been experiencing some symptoms.  If she is still symptomatic I like her to get a cath urine culture.  Nurse visit okay

## 2025-01-07 NOTE — TELEPHONE ENCOUNTER
----- Message from Clarence Mcnair MD sent at 1/6/2025 10:38 PM EST -----  Urine culture showed greater than 100,000 colonies of mixed contaminants.  Patient had been experiencing some symptoms.  If she is still symptomatic I like her to get a cath urine culture.  Nurse visit okay

## 2025-01-10 NOTE — TELEPHONE ENCOUNTER
Radiology calling with report update.    Pt under care of: Tabby Billingsley completed: 1/7 CT Renal    Immediate findings. Please review.

## 2025-01-27 ENCOUNTER — OFFICE VISIT (OUTPATIENT)
Dept: FAMILY MEDICINE CLINIC | Facility: CLINIC | Age: 80
End: 2025-01-27
Payer: COMMERCIAL

## 2025-01-27 VITALS
OXYGEN SATURATION: 98 % | SYSTOLIC BLOOD PRESSURE: 122 MMHG | TEMPERATURE: 97.1 F | BODY MASS INDEX: 22.58 KG/M2 | DIASTOLIC BLOOD PRESSURE: 64 MMHG | HEIGHT: 66 IN | HEART RATE: 61 BPM | WEIGHT: 140.5 LBS

## 2025-01-27 DIAGNOSIS — I69.359 HEMIPLEGIA AND HEMIPARESIS FOLLOWING CEREBRAL INFARCTION AFFECTING UNSPECIFIED SIDE (HCC): ICD-10-CM

## 2025-01-27 DIAGNOSIS — R31.0 GROSS HEMATURIA: ICD-10-CM

## 2025-01-27 DIAGNOSIS — I48.0 PAROXYSMAL ATRIAL FIBRILLATION (HCC): ICD-10-CM

## 2025-01-27 DIAGNOSIS — R93.5 ABNORMAL CT OF THE ABDOMEN: Primary | ICD-10-CM

## 2025-01-27 DIAGNOSIS — M25.50 MULTIPLE JOINT PAIN: ICD-10-CM

## 2025-01-27 PROCEDURE — G2211 COMPLEX E/M VISIT ADD ON: HCPCS | Performed by: INTERNAL MEDICINE

## 2025-01-27 PROCEDURE — 99214 OFFICE O/P EST MOD 30 MIN: CPT | Performed by: INTERNAL MEDICINE

## 2025-01-27 RX ORDER — TRAMADOL HYDROCHLORIDE 50 MG/1
50 TABLET ORAL EVERY 6 HOURS PRN
Qty: 120 TABLET | Refills: 0 | Status: SHIPPED | OUTPATIENT
Start: 2025-01-27

## 2025-01-27 RX ORDER — TRAMADOL HYDROCHLORIDE 50 MG/1
50 TABLET ORAL EVERY 6 HOURS PRN
Qty: 120 TABLET | Refills: 0 | Status: CANCELLED | OUTPATIENT
Start: 2025-01-27

## 2025-01-27 NOTE — ASSESSMENT & PLAN NOTE
She is scheduled for an ablation procedure with her cardiologist on 02/13/2025. She is currently on diltiazem 120 mg daily for atrial fibrillation and Eliquis to prevent further strokes. She will continue her current medication regimen.

## 2025-01-27 NOTE — ASSESSMENT & PLAN NOTE
The CT scan was initially ordered to investigate the possibility of renal calculi, which have been ruled out. The scan revealed inflammation in the colon, suggestive of diverticulitis. However, she does not exhibit symptoms consistent with this diagnosis, such as severe pain or fever. Abdominal exam is non-tender. It is possible that the inflammation observed on the CT scan is a residual effect of her previous bout of diverticulitis. She will be referred back to Dr. Bhatt for further evaluation of her hematuria. A cystoscopy is likely to be performed to rule out bladder cancer.

## 2025-01-27 NOTE — ASSESSMENT & PLAN NOTE
The CT scan revealed inflammation in the colon, suggestive of diverticulitis. However, she does not exhibit symptoms consistent with this diagnosis, such as severe pain or fever. It is possible that the inflammation observed on the CT scan is a residual effect of her previous bout of diverticulitis. Antibiotics are not recommended at this time due to the absence of symptoms.

## 2025-01-27 NOTE — PROGRESS NOTES
Name: Dian Tracey      : 1945      MRN: [id number]  Encounter Provider: Fransisco Duncan MD  Encounter Date: 2025   Encounter department: [facility]    Assessment & Plan  Hemiplegia and hemiparesis following cerebral infarction affecting unspecified side (HCC)  No recurrence of stroke.  Continue Eliquis for stroke prevention.       Paroxysmal atrial fibrillation (HCC)  She is scheduled for an ablation procedure with her cardiologist on 2025. She is currently on diltiazem 120 mg daily for atrial fibrillation and Eliquis to prevent further strokes. She will continue her current medication regimen.       Abnormal CT of the abdomen  The CT scan revealed inflammation in the colon, suggestive of diverticulitis. However, she does not exhibit symptoms consistent with this diagnosis, such as severe pain or fever. It is possible that the inflammation observed on the CT scan is a residual effect of her previous bout of diverticulitis. Antibiotics are not recommended at this time due to the absence of symptoms.       Gross hematuria  The CT scan was initially ordered to investigate the possibility of renal calculi, which have been ruled out. The scan revealed inflammation in the colon, suggestive of diverticulitis. However, she does not exhibit symptoms consistent with this diagnosis, such as severe pain or fever. Abdominal exam is non-tender. It is possible that the inflammation observed on the CT scan is a residual effect of her previous bout of diverticulitis. She will be referred back to Dr. Bhatt for further evaluation of her hematuria. A cystoscopy is likely to be performed to rule out bladder cancer.       She has some xerotic skin on the left upper chest wall which clearly show evidence of excoriation.  Recommended moisturizing lotion.       History of Present Illness     History of Present Illness  The patient is an 84-year-old female who presents for evaluation of  "hematuria, atrial fibrillation, skin irritation, and diverticulitis.    She reports persistent bladder discomfort and the presence of brownish urine. She has been under the care of Dr. Mcnair, who ordered a CT scan of her abdomen to rule out kidney stones. The results were communicated via letter, but she remains uncertain about the findings. She also experiences vaginal pain, characterized by the passage of 3 to 4 drops of brownish fluid, although her urine appears normal. A cystoscopy has not yet been performed, but there are plans to investigate the bladder for potential malignancies.    She continues to experience episodes of atrial fibrillation, despite undergoing various tests. A heart catheterization is scheduled for 02/13/2025, with the aim of performing an ablation procedure. She has been informed that this may require an overnight stay in the hospital. She has a history of stroke but reports no recent issues related to this condition.    She has a history of diverticulitis dating back 20 to 30 years. She reports abdominal pain associated with bowel movements but does not experience any discomfort when she does not need to defecate. She takes Senna every other night to manage constipation, which she tolerates well. However, she experiences cramping and pain during loose bowel movements.    She reports the presence of small pimples on her skin, which she frequently scratches, leading to bleeding. She applies a cream daily to manage this condition, which she finds beneficial.    MEDICATIONS  Current: Diltiazem, Eliquis, Senna     Review of Systems  Objective   /64 (BP Location: Right arm, Patient Position: Sitting, Cuff Size: Standard)   Pulse 61   Temp (!) 97.1 °F (36.2 °C) (Temporal)   Ht 5' 6\" (1.676 m)   Wt 63.7 kg (140 lb 8 oz)   SpO2 98%   BMI 22.68 kg/m²     Physical Exam    Physical Exam  Constitutional:       General: She is not in acute distress.     Appearance: Normal appearance. She is " well-developed. She is not ill-appearing.      Comments: Not toxic-appearing   Neck:      Vascular: No JVD.   Cardiovascular:      Rate and Rhythm: Normal rate and regular rhythm.      Heart sounds: Normal heart sounds. No murmur heard.     No friction rub. No gallop.   Pulmonary:      Breath sounds: Normal breath sounds.   Abdominal:      General: Bowel sounds are normal. There is no distension.      Palpations: Abdomen is soft. There is no mass.      Tenderness: There is no abdominal tenderness. There is no guarding or rebound.   Musculoskeletal:         General: No swelling.   Neurological:      Mental Status: She is alert.

## 2025-02-10 RX ORDER — CEPHALEXIN 500 MG/1
500 CAPSULE ORAL ONCE
Status: COMPLETED | OUTPATIENT
Start: 2025-02-11 | End: 2025-02-11

## 2025-02-11 ENCOUNTER — PROCEDURE VISIT (OUTPATIENT)
Dept: UROLOGY | Facility: CLINIC | Age: 80
End: 2025-02-11
Payer: COMMERCIAL

## 2025-02-11 VITALS
WEIGHT: 143.8 LBS | SYSTOLIC BLOOD PRESSURE: 160 MMHG | HEIGHT: 64 IN | HEART RATE: 62 BPM | OXYGEN SATURATION: 100 % | DIASTOLIC BLOOD PRESSURE: 68 MMHG | TEMPERATURE: 97.7 F | BODY MASS INDEX: 24.55 KG/M2

## 2025-02-11 DIAGNOSIS — R31.0 GROSS HEMATURIA: Primary | ICD-10-CM

## 2025-02-11 LAB
SL AMB  POCT GLUCOSE, UA: NORMAL
SL AMB LEUKOCYTE ESTERASE,UA: NORMAL
SL AMB POCT BILIRUBIN,UA: NORMAL
SL AMB POCT BLOOD,UA: NORMAL
SL AMB POCT CLARITY,UA: CLEAR
SL AMB POCT COLOR,UA: YELLOW
SL AMB POCT KETONES,UA: NORMAL
SL AMB POCT NITRITE,UA: NORMAL
SL AMB POCT PH,UA: 7
SL AMB POCT SPECIFIC GRAVITY,UA: 1.02
SL AMB POCT URINE PROTEIN: NORMAL
SL AMB POCT UROBILINOGEN: 1

## 2025-02-11 PROCEDURE — 52000 CYSTOURETHROSCOPY: CPT | Performed by: UROLOGY

## 2025-02-11 PROCEDURE — 87086 URINE CULTURE/COLONY COUNT: CPT | Performed by: UROLOGY

## 2025-02-11 PROCEDURE — 81003 URINALYSIS AUTO W/O SCOPE: CPT | Performed by: UROLOGY

## 2025-02-11 PROCEDURE — 88112 CYTOPATH CELL ENHANCE TECH: CPT | Performed by: PATHOLOGY

## 2025-02-11 RX ADMIN — CEPHALEXIN 500 MG: 500 CAPSULE ORAL at 14:05

## 2025-02-11 NOTE — PROGRESS NOTES
Cystoscopy     Date/Time  2/11/2025 1:00 PM     Performed by  Clarence Mcnair MD   Authorized by  Clarence Mcnair MD     Universal Protocol:  Consent: Verbal consent obtained.  Risks and benefits: risks, benefits and alternatives were discussed  Consent given by: patient  Patient understanding: patient states understanding of the procedure being performed  Patient consent: the patient's understanding of the procedure matches consent given  Procedure consent: procedure consent matches procedure scheduled  Relevant documents: relevant documents present and verified      Procedure Details:  Procedure type: cystoscopy    Patient tolerance: Patient tolerated the procedure well with no immediate complications    Additional Procedure Details: Patient with hematuria here for follow-up cystoscopy.  Patient had a CT scan which revealed diverticulitis.  She was seen by her PCP who did not feel she had any typical symptoms of diverticulitis and there was no need for treatment.  sHe returns today for further evaluation with cystoscopy.  Informed consent was obtained.    Patient brought to cystoscopy suite identified and a timeout was satisfactory.  Patient was placed in lithotomy position vagina was prepped and draped in usual sterile fashion using Betadine.  Lidocaine jelly instilled per urethra.  Cystoscopy then performed.  Urethra was normal upon entering the bladder on the left trigone and left bladder base there is inflammatory tissue.  This does not have the typical appearance of papillary tumor.  I am not able to see the left ureteral orifice.  The patient had a bladder spasm with minimal filling and it was difficult to evaluate the remainder of the bladder although I do not see any other issues.  The area of inflammation covers at least several centimeters.  Again this is in the region of the left bladder base and trigone.  Photographs were taken.  I did not appreciate any stool in the bladder.    Of note, upon placing  the scope into the bladder I did withdraw some urine which was sent for culture we did not have enough for cytology.    The patient's been noticing a brownish discharge in the urine.  She relates a potential history of pneumaturia.    Given the previous findings of potential diverticulitis, I am going to order a CT with both IV as well as oral contrast for further evaluation of potential fistula.  She had a hysterectomy in the past and I was not able appreciate any abnormality on pelvic exam today.  There is no palpable induration mass or other issue vaginally particularly anteriorly over the region of the bladder base.  There was no significant tenderness on pelvic exam.    Will see her back with a CT scan and I am also recommending eventual biopsy and fulguration pending the CT scan report.  We await the culture she did receive a single dose of antibiotic today.

## 2025-02-12 LAB — BACTERIA UR CULT: ABNORMAL

## 2025-02-17 PROCEDURE — 88112 CYTOPATH CELL ENHANCE TECH: CPT | Performed by: PATHOLOGY

## 2025-02-18 NOTE — H&P
Cystoscopy     Date/Time  2/11/2025 1:00 PM     Performed by  Clarence Mcnair MD   Authorized by  Clarence Mcnair MD     Universal Protocol:  Consent: Verbal consent obtained.  Risks and benefits: risks, benefits and alternatives were discussed  Consent given by: patient  Patient understanding: patient states understanding of the procedure being performed  Patient consent: the patient's understanding of the procedure matches consent given  Procedure consent: procedure consent matches procedure scheduled  Relevant documents: relevant documents present and verified      Procedure Details:  Procedure type: cystoscopy    Patient tolerance: Patient tolerated the procedure well with no immediate complications    Additional Procedure Details: Patient with hematuria here for follow-up cystoscopy.  Patient had a CT scan which revealed diverticulitis.  She was seen by her PCP who did not feel she had any typical symptoms of diverticulitis and there was no need for treatment.  sHe returns today for further evaluation with cystoscopy.  Informed consent was obtained.    Patient brought to cystoscopy suite identified and a timeout was satisfactory.  Patient was placed in lithotomy position vagina was prepped and draped in usual sterile fashion using Betadine.  Lidocaine jelly instilled per urethra.  Cystoscopy then performed.  Urethra was normal upon entering the bladder on the left trigone and left bladder base there is inflammatory tissue.  This does not have the typical appearance of papillary tumor.  I am not able to see the left ureteral orifice.  The patient had a bladder spasm with minimal filling and it was difficult to evaluate the remainder of the bladder although I do not see any other issues.  The area of inflammation covers at least several centimeters.  Again this is in the region of the left bladder base and trigone.  Photographs were taken.  I did not appreciate any stool in the bladder.    Of note, upon placing  the scope into the bladder I did withdraw some urine which was sent for culture we did not have enough for cytology.    The patient's been noticing a brownish discharge in the urine.  She relates a potential history of pneumaturia.    Given the previous findings of potential diverticulitis, I am going to order a CT with both IV as well as oral contrast for further evaluation of potential fistula.  She had a hysterectomy in the past and I was not able appreciate any abnormality on pelvic exam today.  There is no palpable induration mass or other issue vaginally particularly anteriorly over the region of the bladder base.  There was no significant tenderness on pelvic exam.    Will see her back with a CT scan and I am also recommending eventual biopsy and fulguration pending the CT scan report.  We await the culture she did receive a single dose of antibiotic today.    The biopsy procedure its risks and limitations were discussed in detail including bleeding infection bladder injury ureteral obstruction amongst others.  Informed consent for bladder biopsy and fulguration was obtained.

## 2025-02-19 ENCOUNTER — TELEPHONE (OUTPATIENT)
Age: 80
End: 2025-02-19

## 2025-02-19 NOTE — TELEPHONE ENCOUNTER
Patient was calling because she has not been feeling well. Patient states she just got out of the hospital, but do not see that in her records.     She said it takes everything out of her just to get dressed. And not describe how it makes her feel.     She is debating cancelling the CT on 2/25 and the appt with Boline on 3/6    She is going to wait til next week to see how she feels.

## 2025-02-24 ENCOUNTER — TELEPHONE (OUTPATIENT)
Dept: UROLOGY | Facility: CLINIC | Age: 80
End: 2025-02-24

## 2025-03-07 DIAGNOSIS — G47.9 DIFFICULTY SLEEPING: ICD-10-CM

## 2025-03-07 DIAGNOSIS — G25.81 RESTLESS LEGS SYNDROME: ICD-10-CM

## 2025-03-07 RX ORDER — ROPINIROLE 0.5 MG/1
0.5 TABLET, FILM COATED ORAL 3 TIMES DAILY
Qty: 180 TABLET | Refills: 2 | Status: SHIPPED | OUTPATIENT
Start: 2025-03-07

## 2025-03-24 DIAGNOSIS — M25.50 MULTIPLE JOINT PAIN: ICD-10-CM

## 2025-03-25 RX ORDER — TRAMADOL HYDROCHLORIDE 50 MG/1
50 TABLET ORAL EVERY 6 HOURS PRN
Qty: 120 TABLET | Refills: 0 | Status: SHIPPED | OUTPATIENT
Start: 2025-03-25

## 2025-03-25 NOTE — TELEPHONE ENCOUNTER
PDMP reviewed. No red flags identified; safe to proceed with prescription.    PDMP Review       Value Time User    PDMP Reviewed  Yes 3/25/2025  9:47 AM Fransisco Duncan MD

## 2025-04-15 ENCOUNTER — NURSE TRIAGE (OUTPATIENT)
Age: 80
End: 2025-04-15

## 2025-04-15 ENCOUNTER — APPOINTMENT (OUTPATIENT)
Dept: LAB | Facility: CLINIC | Age: 80
End: 2025-04-15
Payer: COMMERCIAL

## 2025-04-15 DIAGNOSIS — R39.9 UTI SYMPTOMS: ICD-10-CM

## 2025-04-15 DIAGNOSIS — R39.9 UTI SYMPTOMS: Primary | ICD-10-CM

## 2025-04-15 LAB
BACTERIA UR QL AUTO: ABNORMAL /HPF
BILIRUB UR QL STRIP: NEGATIVE
CLARITY UR: ABNORMAL
COLOR UR: ABNORMAL
GLUCOSE UR STRIP-MCNC: NEGATIVE MG/DL
HGB UR QL STRIP.AUTO: ABNORMAL
KETONES UR STRIP-MCNC: NEGATIVE MG/DL
LEUKOCYTE ESTERASE UR QL STRIP: ABNORMAL
NITRITE UR QL STRIP: NEGATIVE
NON-SQ EPI CELLS URNS QL MICRO: ABNORMAL /HPF
PH UR STRIP.AUTO: 6 [PH]
PROT UR STRIP-MCNC: ABNORMAL MG/DL
RBC #/AREA URNS AUTO: ABNORMAL /HPF
SP GR UR STRIP.AUTO: 1.02 (ref 1–1.03)
UROBILINOGEN UR STRIP-ACNC: 2 MG/DL
WBC #/AREA URNS AUTO: ABNORMAL /HPF
WBC CLUMPS # UR AUTO: PRESENT /UL

## 2025-04-15 PROCEDURE — 87086 URINE CULTURE/COLONY COUNT: CPT

## 2025-04-15 PROCEDURE — 87077 CULTURE AEROBIC IDENTIFY: CPT

## 2025-04-15 PROCEDURE — 87147 CULTURE TYPE IMMUNOLOGIC: CPT

## 2025-04-15 PROCEDURE — 81001 URINALYSIS AUTO W/SCOPE: CPT

## 2025-04-15 PROCEDURE — 87186 SC STD MICRODIL/AGAR DIL: CPT

## 2025-04-15 NOTE — TELEPHONE ENCOUNTER
Pt under care of:    Office Location: [place]    Insurance   Current Insurance? [employer]   Insurance E-verified? Yes     Pt called and explained she did not have her CT scan or procedure done as recommended due to having a heart ablation. She is now experiencing a lot of bladder pain and flank pain, especially when making a bowel movement. Call connected with CTS for further assistance.

## 2025-04-15 NOTE — TELEPHONE ENCOUNTER
FOLLOW UP: Advised patient to avoid bladder irritating foods and beverages.  Maintain adequate hydration of water.    Ordered urine testing- urine micro and culture    Reviewed ED precautions.    REASON FOR CONVERSATION: Symptom Call    SYMPTOMS: burning with urination, urine frequency, brown discharge from vagina    OTHER: symptoms are on going for months.     DISPOSITION: Order Lab Work (overriding See Within 2 Weeks in Office)    Reason for Disposition   The patient has moderate dysuria with no infection or correlating diagnosis    Answer Assessment - Initial Assessment Questions  1. When did your symptoms start?   -on going   2. Do you experience pain or burning with every void?   -burning with urination   3. Do you have any other urinary symptoms such as urinary frequency, urgency, incontinence, blood in your urine?   -urine frequency   4. Do you have any abdominal pain or flank pain?  -Left side flank pain  5. Do you have a fever of 101 or higher? How did you take your temperature?   -denies    Protocols used: Urology-Dysuria-ADULT-[place]

## 2025-04-15 NOTE — TELEPHONE ENCOUNTER
Would agree with urine testing for culture directed therapy. We will await results.  Please provide Ed precautions and encourage adequate hydration.  Please encourage patient to also follow up with PCP regarding the discharge from her vagina.  I do see she has had a hysterectomy. However, this may require closer evaluation to identify origin.

## 2025-04-16 ENCOUNTER — RESULTS FOLLOW-UP (OUTPATIENT)
Dept: OTHER | Facility: HOSPITAL | Age: 80
End: 2025-04-16

## 2025-04-16 DIAGNOSIS — Z87.440 HISTORY OF RECURRENT UTIS: ICD-10-CM

## 2025-04-16 DIAGNOSIS — R39.9 UTI SYMPTOMS: Primary | ICD-10-CM

## 2025-04-16 DIAGNOSIS — I10 ESSENTIAL HYPERTENSION: ICD-10-CM

## 2025-04-16 NOTE — TELEPHONE ENCOUNTER
VM left with pt, relayed message from May Mota N.P regarding waiting for urine culture results. Advised of ER protocol.  KEEP IN BOX UNTIL CULTURE IS RESULTED

## 2025-04-17 RX ORDER — FUROSEMIDE 40 MG/1
40 TABLET ORAL DAILY PRN
Qty: 90 TABLET | Refills: 1 | Status: SHIPPED | OUTPATIENT
Start: 2025-04-17

## 2025-04-17 RX ORDER — NITROFURANTOIN 25; 75 MG/1; MG/1
100 CAPSULE ORAL 2 TIMES DAILY
Qty: 10 CAPSULE | Refills: 0 | Status: SHIPPED | OUTPATIENT
Start: 2025-04-17 | End: 2025-04-22

## 2025-04-17 NOTE — TELEPHONE ENCOUNTER
Per May's message below patient verbalized aware and understanding    Please let patient know her urine culture did show infection.  We would like to offer her Macrobid.  She will take one table twice daily for 5 days. We will call to inform her if final culture results suggest alternate therapy.   Please also provide ED precautions.

## 2025-04-20 LAB
BACTERIA UR CULT: ABNORMAL
BACTERIA UR CULT: ABNORMAL

## 2025-04-24 ENCOUNTER — RA CDI HCC (OUTPATIENT)
Dept: OTHER | Facility: HOSPITAL | Age: 80
End: 2025-04-24

## 2025-04-28 ENCOUNTER — OFFICE VISIT (OUTPATIENT)
Dept: FAMILY MEDICINE CLINIC | Facility: CLINIC | Age: 80
End: 2025-04-28
Payer: COMMERCIAL

## 2025-04-28 VITALS
DIASTOLIC BLOOD PRESSURE: 60 MMHG | HEART RATE: 75 BPM | BODY MASS INDEX: 22.43 KG/M2 | SYSTOLIC BLOOD PRESSURE: 132 MMHG | HEIGHT: 64 IN | WEIGHT: 131.4 LBS | OXYGEN SATURATION: 96 %

## 2025-04-28 DIAGNOSIS — G47.9 DIFFICULTY SLEEPING: ICD-10-CM

## 2025-04-28 DIAGNOSIS — E78.49 OTHER HYPERLIPIDEMIA: ICD-10-CM

## 2025-04-28 DIAGNOSIS — G25.81 RESTLESS LEGS SYNDROME: ICD-10-CM

## 2025-04-28 DIAGNOSIS — F11.20 CONTINUOUS OPIOID DEPENDENCE (HCC): ICD-10-CM

## 2025-04-28 DIAGNOSIS — L30.9 DERMATITIS: ICD-10-CM

## 2025-04-28 DIAGNOSIS — I10 ESSENTIAL HYPERTENSION: ICD-10-CM

## 2025-04-28 DIAGNOSIS — R39.9 UTI SYMPTOMS: Primary | ICD-10-CM

## 2025-04-28 DIAGNOSIS — I48.0 PAROXYSMAL ATRIAL FIBRILLATION (HCC): ICD-10-CM

## 2025-04-28 PROBLEM — I48.3 TYPICAL ATRIAL FLUTTER (HCC): Status: ACTIVE | Noted: 2025-02-10

## 2025-04-28 PROCEDURE — 99214 OFFICE O/P EST MOD 30 MIN: CPT | Performed by: INTERNAL MEDICINE

## 2025-04-28 PROCEDURE — G2211 COMPLEX E/M VISIT ADD ON: HCPCS | Performed by: INTERNAL MEDICINE

## 2025-04-28 RX ORDER — FUROSEMIDE 40 MG/1
40 TABLET ORAL DAILY
Qty: 90 TABLET | Refills: 1 | Status: SHIPPED | OUTPATIENT
Start: 2025-04-28

## 2025-04-28 RX ORDER — ROPINIROLE 1 MG/1
1 TABLET, FILM COATED ORAL 3 TIMES DAILY
Qty: 270 TABLET | Refills: 0 | Status: SHIPPED | OUTPATIENT
Start: 2025-04-28

## 2025-04-28 RX ORDER — ATORVASTATIN CALCIUM 40 MG/1
40 TABLET, FILM COATED ORAL DAILY
Qty: 90 TABLET | Refills: 1 | Status: SHIPPED | OUTPATIENT
Start: 2025-04-28

## 2025-04-28 RX ORDER — BETAMETHASONE DIPROPIONATE 0.5 MG/G
CREAM TOPICAL 2 TIMES DAILY PRN
Qty: 45 G | Refills: 0 | Status: SHIPPED | OUTPATIENT
Start: 2025-04-28

## 2025-04-28 RX ORDER — FUROSEMIDE 40 MG/1
40 TABLET ORAL DAILY PRN
Qty: 90 TABLET | Refills: 1 | OUTPATIENT
Start: 2025-04-28

## 2025-04-28 NOTE — ASSESSMENT & PLAN NOTE
She is status post ablation procedure.  She is now off of the diltiazem.  Continue follow-up with cardiology.

## 2025-04-28 NOTE — ASSESSMENT & PLAN NOTE
Blood pressure controlled.  Orders:    furosemide (LASIX) 40 mg tablet; Take 1 tablet (40 mg total) by mouth daily For edema

## 2025-04-28 NOTE — PROGRESS NOTES
Name: Dian Tracey      : 1945      MRN: [id number]  Encounter Provider: Fransisco Duncan MD  Encounter Date: 2025   Encounter department: [facility]    :  Assessment & Plan  UTI symptoms  She grew out 2 bacteria from her urine culture both of which were susceptible to nitrofurantoin.  I'm wondering whether she just has a problem with overactive bladder.  I encouraged her to contact the urologist for follow-up.       Continuous opioid dependence (HCC)  She takes Ultram as needed for back pain and arthritis pain.  Continue current regimen.       Paroxysmal atrial fibrillation (HCC)  She is status post ablation procedure.  She is now off of the diltiazem.  Continue follow-up with cardiology.       Other hyperlipidemia  Somehow, she stopped taking the atorvastatin sometime last year.  I asked her to restart it because of her history of stroke.  Orders:    atorvastatin (LIPITOR) 40 mg tablet; Take 1 tablet (40 mg total) by mouth daily    Restless legs syndrome  She is not doing well on the 0.5 mg 3 times daily dose of ropinirole.  Increased to 1 mg 3 times a day.  Orders:    rOPINIRole (REQUIP) 1 mg tablet; Take 1 tablet (1 mg total) by mouth 3 (three) times a day    Difficulty sleeping    Orders:    rOPINIRole (REQUIP) 1 mg tablet; Take 1 tablet (1 mg total) by mouth 3 (three) times a day    Dermatitis    Orders:    betamethasone dipropionate (DIPROSONE) 0.05 % cream; Apply topically 2 (two) times a day as needed for rash    Essential hypertension  Blood pressure controlled.  Orders:    furosemide (LASIX) 40 mg tablet; Take 1 tablet (40 mg total) by mouth daily For edema            Depression Screening and Follow-up Plan: Patient was screened for depression during today's encounter. They screened negative with a PHQ-9 score of 1.      Urinary Incontinence Plan of Care: Referral was placed for Urology.         History of Present Illness     History of Present Illness  The patient  "is an 79-year-old female presenting for evaluation of UTI, constipation, restless legs syndrome, and medication management.    Persistent bladder infection symptoms with severe dysuria despite a 5-day course of antibiotics. Under Dr. Mcnair's care at. CT scan in 01/2025 showed diverticulitis, no kidney stones. Several appointments postponed due to recent ablation. Upcoming urologist appointment in 08/2025.    Ongoing constipation, somewhat alleviated by prune juice every third day, but significant discomfort during bowel movements when taken.    On three daily medications for restless legs syndrome, administered every 8 hours, but symptoms occur every 6 hours, described as jerking movements, necessitating frequent walking.    Atorvastatin discontinued for months. Receiving Prolia injections with Dr. Lopez. Takes tramadol as needed for arthritis and back pain. Daily furosemide with no side effects.    Ablation for atrial fibrillation performed.       Objective   /60 (BP Location: Left arm, Patient Position: Sitting, Cuff Size: Large)   Pulse 75   Ht 5' 4\" (1.626 m)   Wt 59.6 kg (131 lb 6.4 oz)   SpO2 96%   BMI 22.55 kg/m²     Physical Exam    Physical Exam  Constitutional:       General: She is not in acute distress.     Appearance: Normal appearance. She is well-developed. She is not ill-appearing.   Neck:      Vascular: No JVD.   Cardiovascular:      Rate and Rhythm: Normal rate and regular rhythm.      Heart sounds: Normal heart sounds. No murmur heard.     No friction rub. No gallop.   Pulmonary:      Breath sounds: Normal breath sounds.   Abdominal:      General: Bowel sounds are normal. There is no distension.      Palpations: Abdomen is soft. There is no mass.   Musculoskeletal:         General: No swelling.   Neurological:      Mental Status: She is alert.         "

## 2025-05-08 ENCOUNTER — TELEPHONE (OUTPATIENT)
Dept: UROLOGY | Facility: CLINIC | Age: 80
End: 2025-05-08

## 2025-05-10 DIAGNOSIS — F51.04 CHRONIC INSOMNIA: ICD-10-CM

## 2025-05-11 RX ORDER — TRAZODONE HYDROCHLORIDE 50 MG/1
50 TABLET ORAL
Qty: 90 TABLET | Refills: 1 | Status: SHIPPED | OUTPATIENT
Start: 2025-05-11

## 2025-05-12 ENCOUNTER — TELEPHONE (OUTPATIENT)
Dept: UROLOGY | Facility: CLINIC | Age: 80
End: 2025-05-12

## 2025-05-12 NOTE — LETTER
[facility] [address]   [address] FLO[address]  [facility] [address]  Phone#  [phone]  Fax#  [phone]      May 12, 2025      Dear:   Dian Tracey         Our office has attempted to contact you several times regarding recent orders from urology.  Could you please contact our office at [phone].    Thank you.     Sincerely,    [facility]

## 2025-05-12 NOTE — TELEPHONE ENCOUNTER
Sent letter due to we tried reaching patient several times. Please see other telephone encounters.

## 2025-05-16 DIAGNOSIS — M25.50 MULTIPLE JOINT PAIN: ICD-10-CM

## 2025-05-19 RX ORDER — TRAMADOL HYDROCHLORIDE 50 MG/1
50 TABLET ORAL EVERY 6 HOURS PRN
Qty: 120 TABLET | Refills: 0 | Status: SHIPPED | OUTPATIENT
Start: 2025-05-19

## 2025-07-10 DIAGNOSIS — G47.9 DIFFICULTY SLEEPING: ICD-10-CM

## 2025-07-10 DIAGNOSIS — G25.81 RESTLESS LEGS SYNDROME: ICD-10-CM

## 2025-07-10 RX ORDER — ROPINIROLE 1 MG/1
1 TABLET, FILM COATED ORAL 3 TIMES DAILY
Qty: 270 TABLET | Refills: 0 | Status: SHIPPED | OUTPATIENT
Start: 2025-07-10

## 2025-07-10 NOTE — TELEPHONE ENCOUNTER
Reason for call:   [x] Refill   [] Prior Auth  [] Other:     Office:   [x] PCP/Provider -  [facility]  Authorized By: Fransisco Duncan MD  [] Specialty/Provider -     Medication:     rOPINIRole (REQUIP) 1 mg tablet 1 mg, 3 times daily       Pharmacy: [facility] [id number] [facility]   Does the patient have enough for 3 days?   [] Yes   [x] No - Send as HP to POD    Mail Away Pharmacy   Does the patient have enough for 10 days?   [] Yes   [] No - Send as HP to POD

## 2025-08-14 ENCOUNTER — OFFICE VISIT (OUTPATIENT)
Dept: URGENT CARE | Facility: CLINIC | Age: 80
End: 2025-08-14
Payer: COMMERCIAL

## 2025-08-14 ENCOUNTER — APPOINTMENT (OUTPATIENT)
Dept: LAB | Facility: CLINIC | Age: 80
End: 2025-08-14
Payer: COMMERCIAL

## 2025-08-18 ENCOUNTER — RA CDI HCC (OUTPATIENT)
Dept: OTHER | Facility: HOSPITAL | Age: 80
End: 2025-08-18

## 2025-08-25 PROBLEM — Z00.00 MEDICARE ANNUAL WELLNESS VISIT, SUBSEQUENT: Status: ACTIVE | Noted: 2025-08-25
